# Patient Record
Sex: FEMALE | Race: WHITE | Employment: UNEMPLOYED | URBAN - METROPOLITAN AREA
[De-identification: names, ages, dates, MRNs, and addresses within clinical notes are randomized per-mention and may not be internally consistent; named-entity substitution may affect disease eponyms.]

---

## 2017-01-06 ENCOUNTER — TRANSFERRED RECORDS (OUTPATIENT)
Dept: HEALTH INFORMATION MANAGEMENT | Facility: CLINIC | Age: 27
End: 2017-01-06

## 2017-01-06 LAB
CREAT SERPL-MCNC: 0.7 MG/DL (ref 0.55–1.02)
GFR SERPL CREATININE-BSD FRML MDRD: >60 ML/MIN/1.73M2
HBA1C MFR BLD: 7.7 % (ref 4–5.6)
LDLC SERPL CALC-MCNC: 84 MG/DL (ref 0–130)

## 2017-04-19 ENCOUNTER — TRANSFERRED RECORDS (OUTPATIENT)
Dept: HEALTH INFORMATION MANAGEMENT | Facility: CLINIC | Age: 27
End: 2017-04-19

## 2017-04-25 ENCOUNTER — OFFICE VISIT (OUTPATIENT)
Dept: PEDIATRICS | Facility: CLINIC | Age: 27
End: 2017-04-25
Payer: COMMERCIAL

## 2017-04-25 VITALS
DIASTOLIC BLOOD PRESSURE: 60 MMHG | OXYGEN SATURATION: 97 % | HEART RATE: 83 BPM | WEIGHT: 134.5 LBS | HEIGHT: 64 IN | TEMPERATURE: 98.3 F | BODY MASS INDEX: 22.96 KG/M2 | SYSTOLIC BLOOD PRESSURE: 112 MMHG

## 2017-04-25 DIAGNOSIS — Z30.09 COUNSELING FOR BIRTH CONTROL, INTRAUTERINE DEVICE: ICD-10-CM

## 2017-04-25 DIAGNOSIS — E10.319 TYPE 1 DIABETES MELLITUS WITH RETINOPATHY WITHOUT MACULAR EDEMA, UNSPECIFIED LATERALITY, UNSPECIFIED RETINOPATHY SEVERITY (H): Primary | ICD-10-CM

## 2017-04-25 DIAGNOSIS — Z23 NEED FOR HPV VACCINATION: ICD-10-CM

## 2017-04-25 PROCEDURE — 99207 C FOOT EXAM  NO CHARGE: CPT | Performed by: INTERNAL MEDICINE

## 2017-04-25 PROCEDURE — 90651 9VHPV VACCINE 2/3 DOSE IM: CPT | Performed by: INTERNAL MEDICINE

## 2017-04-25 PROCEDURE — 87591 N.GONORRHOEAE DNA AMP PROB: CPT | Performed by: INTERNAL MEDICINE

## 2017-04-25 PROCEDURE — 99213 OFFICE O/P EST LOW 20 MIN: CPT | Mod: 25 | Performed by: INTERNAL MEDICINE

## 2017-04-25 PROCEDURE — 87491 CHLMYD TRACH DNA AMP PROBE: CPT | Performed by: INTERNAL MEDICINE

## 2017-04-25 PROCEDURE — 90471 IMMUNIZATION ADMIN: CPT | Performed by: INTERNAL MEDICINE

## 2017-04-25 NOTE — MR AVS SNAPSHOT
After Visit Summary   4/25/2017    Robyn Marcelo    MRN: [id number]           Patient Information     Date Of Birth          1990        Visit Information        Provider Department      4/25/2017 8:30 AM Claudine Sears MD [facility]        Today's Diagnoses     Type 1 diabetes mellitus with retinopathy without macular edema, unspecified laterality, unspecified retinopathy severity (H)    -  1    Counseling for birth control, intrauterine device          Care Instructions    For diabetes:  Keep up the great work! Follow-up with [facility], we will request records from them  -- You should get the pneumonia vaccine.    For birth control/IUD  -- STD testing today  -- Follow-up with Lena Patel to talk about this    HPV vaccine today, come back in 2 months for next dose.         Follow-ups after your visit        Who to contact     If you have questions or need follow up information about today's clinic visit or your schedule please contact [facility] directly at [phone].  Normal or non-critical lab and imaging results will be communicated to you by Yogomehart, letter or phone within 4 business days after the clinic has received the results. If you do not hear from us within 7 days, please contact the clinic through MalibuIQt or phone. If you have a critical or abnormal lab result, we will notify you by phone as soon as possible.  Submit refill requests through Xinguodu or call your pharmacy and they will forward the refill request to us. Please allow 3 business days for your refill to be completed.          Additional Information About Your Visit        MyChart Information     Xinguodu gives you secure access to your electronic health record. If you see a primary care provider, you can also send messages to your care team and make appointments. If you have questions, please call your primary care clinic.  If you do not have a primary care provider, please call  "[phone] and they will assist you.        Care EveryWhere ID     This is your Care EveryWhere ID. This could be used by other organizations to access your [place] medical records  [id number]        Your Vitals Were     Pulse Temperature Height Last Period Pulse Oximetry Breastfeeding?    83 98.3  F (36.8  C) (Oral) 5' 4\" (1.626 m) 04/14/2017 (Approximate) 97% No    BMI (Body Mass Index)                   23.09 kg/m2            Blood Pressure from Last 3 Encounters:   04/25/17 112/60   08/19/15 104/68   08/07/15 106/62    Weight from Last 3 Encounters:   04/25/17 134 lb 8 oz (61 kg)   08/19/15 137 lb (62.1 kg)   08/07/15 138 lb 8 oz (62.8 kg)              We Performed the Following     Chlamydia trachomatis PCR     FOOT EXAM     Neisseria gonorrhoeae PCR          Today's Medication Changes          These changes are accurate as of: 4/25/17  9:13 AM.  If you have any questions, ask your nurse or doctor.               These medicines have changed or have updated prescriptions.        Dose/Directions    NovoLOG VIAL 100 UNITS/ML injection   This may have changed:  Another medication with the same name was removed. Continue taking this medication, and follow the directions you see here.   Generic drug:  insulin aspart   Changed by:  Claudine Sears MD        For insulin pump, please see pump settings. patient takes 14.9 units per 24 hours. Bolus 1 unit per 11g carb   Quantity:  30 mL   Refills:  0                Primary Care Provider Office Phone # Fax #    Susannah Thompson [phone]       [facility] [place] [address] [place] [address]        Thank you!     Thank you for choosing [facility]  for your care. Our goal is always to provide you with excellent care. Hearing back from our patients is one way we can continue to improve our services. Please take a few minutes to complete the written survey that you may receive in the mail after your visit with us. " Thank you!             Your Updated Medication List - Protect others around you: Learn how to safely use, store and throw away your medicines at www.disposemymeds.org.          This list is accurate as of: 4/25/17  9:13 AM.  Always use your most recent med list.                   Brand Name Dispense Instructions for use    * ACE/ARB NOT PRESCRIBED (INTENTIONAL)      5 each continuous prn. ACE & ARB not prescribed due to Patient of childbearing potential       amoxicillin-clavulanate 875-125 MG per tablet    AUGMENTIN     TAKE 1 TABLET BY MOUTH TWICE DAILY UNTIL GONE       blood glucose monitoring test strip    no brand specified    1 Box    by In Vitro route 4 times daily.       PicketCAN FINEPOINT LANCETS Misc     100 each    Use to test blood sugars 4 times daily and as needed.       NovoLOG VIAL 100 UNITS/ML injection   Generic drug:  insulin aspart     30 mL    For insulin pump, please see pump settings. patient takes 14.9 units per 24 hours. Bolus 1 unit per 11g carb       * STATIN NOT PRESCRIBED (INTENTIONAL)     0 each    10 each continuous prn. Statin not prescribed intentionally due to Other: Not indicated       * Notice:  This list has 2 medication(s) that are the same as other medications prescribed for you. Read the directions carefully, and ask your doctor or other care provider to review them with you.

## 2017-04-25 NOTE — PATIENT INSTRUCTIONS
For diabetes:  Keep up the great work! Follow-up with [facility], we will request records from them  -- You should get the pneumonia vaccine.    For birth control/IUD  -- STD testing today  -- Follow-up with Lena Patel to talk about this    HPV vaccine today, come back in 2 months for next dose.

## 2017-04-25 NOTE — NURSING NOTE
"Chief Complaint   Patient presents with     Establish Care       Initial /60 (BP Location: Right arm, Patient Position: Chair, Cuff Size: Adult Regular)  Pulse 83  Temp 98.3  F (36.8  C) (Oral)  Ht 5' 4\" (1.626 m)  Wt 134 lb 8 oz (61 kg)  LMP 04/14/2017 (Approximate)  SpO2 97%  Breastfeeding? No  BMI 23.09 kg/m2 Estimated body mass index is 23.09 kg/(m^2) as calculated from the following:    Height as of this encounter: 5' 4\" (1.626 m).    Weight as of this encounter: 134 lb 8 oz (61 kg).  Medication Reconciliation: complete     Bethany Renee MA 4/25/2017 8:49 AM    "

## 2017-04-25 NOTE — PROGRESS NOTES
SUBJECTIVE:                                                    Robyn Marcelo is a 26 year old female who presents to clinic today for the following health issues:      New Patient/Transfer of Care  Pt is here today to establish care with Dr. Sears. Pt would also like to discuss getting an IUD. Pt has been on oral contraceptive in the past.     1. Type 1 DM: She follows at [facility] for management of diabetes. Is currently on an insulin pump and doing well with this. Last Hgba1c less than 6.5. Does have mild retinopathy. Otherwise doing well.    2. Infected pilonidal cyst: Recently diagnosed last week, saw surgery and this was drained. Planning on having this removed after she gets  in 2 months.    3. Sees Dermatologist for mole observation.    4. Contraception: Previously on OCP, interested in having IUD placed. Did have secondary amenorrhea after stopping OCP, wondering if this will be a problem with the IUD.       Problem list and histories reviewed & adjusted, as indicated.  Additional history: as documented    Patient Active Problem List   Diagnosis     CARDIOVASCULAR SCREENING; LDL GOAL LESS THAN 160     Acne     Secondary amenorrhea     Type 1 diabetes mellitus with retinopathy without macular edema (H)     Past Surgical History:   Procedure Laterality Date     EXTRACTION(S) DENTAL         Social History   Substance Use Topics     Smoking status: Never Smoker     Smokeless tobacco: Never Used     Alcohol use Yes      Comment: 2 times per month, 1-2 drinks     Family History   Problem Relation Age of Onset     Hyperlipidemia Mother      Hypertension Mother      Deep Vein Thrombosis Father      Deep Vein Thrombosis Sister      during pregnancy     CANCER Paternal Grandfather      lung           Reviewed and updated as needed this visit by clinical staff  Tobacco  Allergies  Meds  Med Hx  Fam Hx  Soc Hx        ROS:  Constitutional, HEENT, cardiovascular, pulmonary, gi and gu systems are  "negative, except as otherwise noted.    OBJECTIVE:                                                    /60 (BP Location: Right arm, Patient Position: Chair, Cuff Size: Adult Regular)  Pulse 83  Temp 98.3  F (36.8  C) (Oral)  Ht 5' 4\" (1.626 m)  Wt 134 lb 8 oz (61 kg)  LMP 04/14/2017 (Approximate)  SpO2 97%  Breastfeeding? No  BMI 23.09 kg/m2  Body mass index is 23.09 kg/(m^2).  GENERAL: healthy, alert and no distress  PSYCH: mentation appears normal, affect normal/bright  Diabetic foot exam: normal DP and PT pulses, no trophic changes or ulcerative lesions, normal sensory exam and normal monofilament exam    Diagnostic Test Results:  none      ASSESSMENT/PLAN:                                                      1. Type 1 diabetes mellitus with retinopathy without macular edema, unspecified laterality, unspecified retinopathy severity (H)  [facility] for [facility], on insulin pump and previously well controlled. Will request records. Patient does get her eyes examined annually. Otherwise will hold off on labs, medication adjustment today.   - FOOT EXAM  - EYE EXAM (SIMPLE-NONBILLABLE)    2. Counseling for birth control, intrauterine device  Reviewed contraceptive options, patient interested in IUD placement. Will have her follow-up with OB or Lena Patel. Will obtain STI testing today.   - Chlamydia trachomatis PCR  - Neisseria gonorrhoeae PCR    3. Need for HPV vaccination  - HUMAN PAPILLOMA VIRUS (GARDASIL 9) VACCINE    Patient Instructions   For diabetes:  Keep up the great work! Follow-up with [facility], we will request records from them  -- You should get the pneumonia vaccine.    For birth control/IUD  -- STD testing today  -- Follow-up with Lena Patel to talk about this    HPV vaccine today, come back in 2 months for next dose.       Claudine Burns MD  [facility] [place]    "

## 2017-04-25 NOTE — NURSING NOTE
Screening Questionnaire for Adult Immunization    Are you sick today?   No   Do you have allergies to medications, food, a vaccine component or latex?   No   Have you ever had a serious reaction after receiving a vaccination?   No   Do you have a long-term health problem with heart disease, lung disease, asthma, kidney disease, metabolic disease (e.g. diabetes), anemia, or other blood disorder?   Yes   Do you have cancer, leukemia, HIV/AIDS, or any other immune system problem?   No   In the past 3 months, have you taken medications that affect  your immune system, such as prednisone, other steroids, or anticancer drugs; drugs for the treatment of rheumatoid arthritis, Crohn s disease, or psoriasis; or have you had radiation treatments?   No   Have you had a seizure, or a brain or other nervous system problem?   No   During the past year, have you received a transfusion of blood or blood     products, or been given immune (gamma) globulin or antiviral drug?   No   For women: Are you pregnant or is there a chance you could become        pregnant during the next month?   No   Have you received any vaccinations in the past 4 weeks?   No     Immunization questionnaire was positive for at least one answer.       MNVFC doesn't apply on this patient    Per orders of Dr. Sears, injection of 1st HPV given by Jaquelin Renee. Patient instructed to remain in clinic for 20 minutes afterwards, and to report any adverse reaction to me immediately.       Screening performed by Jaquelin Renee on 4/25/2017 at 9:37 AM.

## 2017-04-26 LAB
C TRACH DNA SPEC QL NAA+PROBE: NORMAL
N GONORRHOEA DNA SPEC QL NAA+PROBE: NORMAL
SPECIMEN SOURCE: NORMAL
SPECIMEN SOURCE: NORMAL

## 2017-05-02 ENCOUNTER — OFFICE VISIT (OUTPATIENT)
Dept: PEDIATRICS | Facility: CLINIC | Age: 27
End: 2017-05-02
Payer: COMMERCIAL

## 2017-05-02 VITALS
HEART RATE: 66 BPM | WEIGHT: 133.4 LBS | BODY MASS INDEX: 22.9 KG/M2 | SYSTOLIC BLOOD PRESSURE: 114 MMHG | DIASTOLIC BLOOD PRESSURE: 73 MMHG | TEMPERATURE: 97.7 F

## 2017-05-02 DIAGNOSIS — Z12.4 SCREENING FOR MALIGNANT NEOPLASM OF CERVIX: ICD-10-CM

## 2017-05-02 DIAGNOSIS — Z30.430 ENCOUNTER FOR IUD INSERTION: Primary | ICD-10-CM

## 2017-05-02 PROCEDURE — 58300 INSERT INTRAUTERINE DEVICE: CPT | Performed by: NURSE PRACTITIONER

## 2017-05-02 PROCEDURE — G0145 SCR C/V CYTO,THINLAYER,RESCR: HCPCS | Performed by: NURSE PRACTITIONER

## 2017-05-02 RX ORDER — COPPER 313.4 MG/1
1 INTRAUTERINE DEVICE INTRAUTERINE ONCE
Qty: 1 EACH
Start: 2017-05-02 | End: 2017-05-02

## 2017-05-02 NOTE — PROGRESS NOTES
Subjective:   Robyn Marcelo is a 26 year old female who scheduled an office visit to discuss IUD placement.  CC: Referred from Claudine Sears MD.  She would like this method because of convenience.  She is nulliparous, youngest child is age NA.  She is currently using abstinence for birth control.  She declines. STI screening prior to IUD placement, no new partners since last screening. She notes she is Jehovah witnesses and her boyfriend is in [place] now, mutually monogamous, no hx of sex.      Chief Complaint/ History of Present Illness:Robyn Marcelo is a 26 year old female.   Patient's last menstrual period was 04/14/2017 (approximate)..  A pregnancy test was not done today.  She is currently using Abstinence for birth control.   A chlamydia/gonorrhea test was was not done today because of no new partners since last screening  She is here for an IUD insertion.  Patient has been given written information.  I have reviewed the risks of the IUD including pregnancy, PID, life threatening infection, perforation, expulsion, cramping, changes in bleeding and ovarian cysts. Benefits of the IUD and alternative family planning methods have been discussed.  Patients questions are answered.  Patient has verbalized understanding of risks and benefits and has signed the consent form.    Medical, surgical and social histories reviewed.  Current Outpatient Prescriptions   Medication Sig Dispense Refill     insulin aspart (NOVOLOG VIAL) 100 UNITS/ML injection For insulin pump, please see pump settings. patient takes 14.9 units per 24 hours. Bolus 1 unit per 11g carb 30 mL 0     ACE/ARB NOT PRESCRIBED, INTENTIONAL, 5 each continuous prn. ACE & ARB not prescribed due to Patient of childbearing potential       STATIN NOT PRESCRIBED, INTENTIONAL, 10 each continuous prn. Statin not prescribed intentionally due to Other: Not indicated 0 each 0     glucose blood VI test strips strip by In Vitro route 4 times daily. 1 Box 12      Benbria FINEPOINT LANCETS MISC Use to test blood sugars 4 times daily and as needed. 100 each prn       Objective:   /73  Pulse 66  Temp 97.7  F (36.5  C) (Tympanic)  Wt 133 lb 6.4 oz (60.5 kg)  LMP 04/14/2017 (Approximate)  BMI 22.9 kg/m2  Abdomen: soft, nontender, without hepatosplenomegaly or masses  : normal cervix, adnexae, and uterus without masses or discharge  IUD type: Paragard T-380    Procedure:  Uterus assessed for position and is Anteverted.  Speculum inserted.  Betadine prep of cervix done.  Tenaculum not used.  Uterus sounded to 7 cm's.  Cervical dilators used.   IUD inserted in the usual fashion without problems, ie: resistance, severe protracted pain or excessive bleeding.  Strings trimmed to 2 cm's.  Patient tolerated the procedure  well without any prolonged pain or syncopy.      ASSESSMENT/ PLAN:  (Z30.893) Encounter for IUD insertion  (primary encounter diagnosis)  Comment:   Plan:     Instructions given to patient regarding checking IUD strings, returning to the clinic if pain or inability to check strings and/or irregular bleeding.    Pt to RTC in 4-6 weeks for IUD check.

## 2017-05-02 NOTE — PATIENT INSTRUCTIONS
You may experience a period or spotting that will last over the next week. This usually starts to lighten after a few days. By the time you come back to clinic in 1 month for follow up, this spotting is usually gone, unless you are one of the few that experience spotting that can last up to 3-6 months. You may experience cramping for the remainder of the day and continue to take ibuprofen 2-3 tabs every six hours as needed. This will likely subside over the next day.      Schedule a follow up appointment with me in 1 month. Please contact me by mychart of phone if you should have any unusual symptoms or concerns in the mean time.

## 2017-05-02 NOTE — MR AVS SNAPSHOT
After Visit Summary   5/2/2017    Robyn Marcelo    MRN: [id number]           Patient Information     Date Of Birth          1990        Visit Information        Provider Department      5/2/2017 8:30 AM Lena Patel APRN CNP [facility]        Today's Diagnoses     Encounter for IUD insertion    -  1      Care Instructions    You may experience a period or spotting that will last over the next week. This usually starts to lighten after a few days. By the time you come back to clinic in 1 month for follow up, this spotting is usually gone, unless you are one of the few that experience spotting that can last up to 3-6 months. You may experience cramping for the remainder of the day and continue to take ibuprofen 2-3 tabs every six hours as needed. This will likely subside over the next day.      Schedule a follow up appointment with me in 1 month. Please contact me by mychart of phone if you should have any unusual symptoms or concerns in the mean time.           Follow-ups after your visit        Your next 10 appointments already scheduled     May 30, 2017  8:30 AM CDT   Office Visit with DYLAN Eaton CNP   [facility]    [address]  [address]  [place] [address] [phone]           Bring a current list of meds and any records pertaining to this visit.  For Physicals, please bring immunization records and any forms needing to be filled out.  Please arrive 10 minutes early to complete paperwork.              Who to contact     If you have questions or need follow up information about today's clinic visit or your schedule please contact [facility] directly at [phone].  Normal or non-critical lab and imaging results will be communicated to you by MyChart, letter or phone within 4 business days after the clinic has received the results. If you do not hear from us within 7 days,  please contact the clinic through fabrooms or phone. If you have a critical or abnormal lab result, we will notify you by phone as soon as possible.  Submit refill requests through fabrooms or call your pharmacy and they will forward the refill request to us. Please allow 3 business days for your refill to be completed.          Additional Information About Your Visit        Boost My AdsharVenda Information     fabrooms gives you secure access to your electronic health record. If you see a primary care provider, you can also send messages to your care team and make appointments. If you have questions, please call your primary care clinic.  If you do not have a primary care provider, please call [phone] and they will assist you.        Care EveryWhere ID     This is your Care EveryWhere ID. This could be used by other organizations to access your [facility] medical records  [id number]        Your Vitals Were     Pulse Temperature Last Period BMI (Body Mass Index)          66 97.7  F (36.5  C) (Tympanic) 04/14/2017 (Approximate) 22.9 kg/m2         Blood Pressure from Last 3 Encounters:   05/02/17 114/73   04/25/17 112/60   08/19/15 104/68    Weight from Last 3 Encounters:   05/02/17 133 lb 6.4 oz (60.5 kg)   04/25/17 134 lb 8 oz (61 kg)   08/19/15 137 lb (62.1 kg)              We Performed the Following     INSERTION INTRAUTERINE DEVICE          Today's Medication Changes          These changes are accurate as of: 5/2/17  9:05 AM.  If you have any questions, ask your nurse or doctor.               Start taking these medicines.        Dose/Directions    paragard intrauterine copper   Used for:  Encounter for IUD insertion   Started by:  Lena Patel, APRN CNP        Dose:  1 each   1 each by Intrauterine route once for 1 dose   Quantity:  1 each   Refills:  0            Where to get your medicines      Some of these will need a paper prescription and others can be bought over the counter.  Ask your nurse if you have  questions.     You don't need a prescription for these medications     paragard intrauterine copper                Primary Care Provider Office Phone # Fax #    Claudine Sears [phone]       [facility] [place] [address] [place] [address]        Thank you!     Thank you for choosing [facility]  for your care. Our goal is always to provide you with excellent care. Hearing back from our patients is one way we can continue to improve our services. Please take a few minutes to complete the written survey that you may receive in the mail after your visit with us. Thank you!             Your Updated Medication List - Protect others around you: Learn how to safely use, store and throw away your medicines at www.disposemymeds.org.          This list is accurate as of: 5/2/17  9:05 AM.  Always use your most recent med list.                   Brand Name Dispense Instructions for use    * ACE/ARB NOT PRESCRIBED (INTENTIONAL)      5 each continuous prn. ACE & ARB not prescribed due to Patient of childbearing potential       blood glucose monitoring test strip    no brand specified    1 Box    by In Vitro route 4 times daily.       LIFESCAN FINEPOINT LANCETS Misc     100 each    Use to test blood sugars 4 times daily and as needed.       NovoLOG VIAL 100 UNITS/ML injection   Generic drug:  insulin aspart     30 mL    For insulin pump, please see pump settings. patient takes 14.9 units per 24 hours. Bolus 1 unit per 11g carb       paragard intrauterine copper     1 each    1 each by Intrauterine route once for 1 dose       * STATIN NOT PRESCRIBED (INTENTIONAL)     0 each    10 each continuous prn. Statin not prescribed intentionally due to Other: Not indicated       * Notice:  This list has 2 medication(s) that are the same as other medications prescribed for you. Read the directions carefully, and ask your doctor or other care provider to review them with you.

## 2017-05-02 NOTE — NURSING NOTE
"Chief Complaint   Patient presents with     IUD     IUD consult.       Initial /73  Pulse 66  Temp 97.7  F (36.5  C) (Tympanic)  Wt 133 lb 6.4 oz (60.5 kg)  LMP 04/14/2017 (Approximate)  BMI 22.9 kg/m2 Estimated body mass index is 22.9 kg/(m^2) as calculated from the following:    Height as of 4/25/17: 5' 4\" (1.626 m).    Weight as of this encounter: 133 lb 6.4 oz (60.5 kg).  Medication Reconciliation: complete    "

## 2017-05-05 LAB
COPATH REPORT: NORMAL
PAP: NORMAL

## 2017-05-09 ENCOUNTER — TELEPHONE (OUTPATIENT)
Dept: PEDIATRICS | Facility: CLINIC | Age: 27
End: 2017-05-09

## 2017-05-09 NOTE — TELEPHONE ENCOUNTER
Panel Management Review          Composite cancer screening  Chart review shows that this patient is due/due soon for the following None  Summary:    Patient is due/failing the following:   DIABETIC LABS FAILING ON [facility]    Action needed:   DIABETIC LABS FROM OUTSIDE CLINIC SENT FOR ABSTRACTION TO SATISFY HM.    Type of outreach:    LABS SENT FOR ABSTRACTION.    Questions for provider review:    None                                                                                                                                    Fatuma Unger CMA([facility])

## 2017-05-30 ENCOUNTER — OFFICE VISIT (OUTPATIENT)
Dept: PEDIATRICS | Facility: CLINIC | Age: 27
End: 2017-05-30
Payer: COMMERCIAL

## 2017-05-30 VITALS
WEIGHT: 130.8 LBS | HEART RATE: 55 BPM | DIASTOLIC BLOOD PRESSURE: 55 MMHG | SYSTOLIC BLOOD PRESSURE: 97 MMHG | TEMPERATURE: 97.3 F | BODY MASS INDEX: 22.45 KG/M2

## 2017-05-30 DIAGNOSIS — Z30.431 IUD CHECK UP: Primary | ICD-10-CM

## 2017-05-30 PROCEDURE — 99213 OFFICE O/P EST LOW 20 MIN: CPT | Performed by: NURSE PRACTITIONER

## 2017-05-30 NOTE — PROGRESS NOTES
Chief Complaint/ History of Present Illness:Robyn Marcelo is a 26 year old female here today for IUD follow up.  She was in clinic 3-4 weeks ago for placement of ParaGard IUD.  Since placement, she notes she bled/spotted for 5 days and denies cramping.  She has not had intercourse since placement and denies problems, pain or partner complaints.    No Known Allergies  Current Outpatient Prescriptions   Medication Sig Dispense Refill     insulin aspart (NOVOLOG VIAL) 100 UNITS/ML injection For insulin pump, please see pump settings. patient takes 14.9 units per 24 hours. Bolus 1 unit per 11g carb 30 mL 0     ACE/ARB NOT PRESCRIBED, INTENTIONAL, 5 each continuous prn. ACE & ARB not prescribed due to Patient of childbearing potential       STATIN NOT PRESCRIBED, INTENTIONAL, 10 each continuous prn. Statin not prescribed intentionally due to Other: Not indicated 0 each 0     glucose blood VI test strips strip by In Vitro route 4 times daily. 1 Box 12     LIFESCAN FINEPOINT LANCETS MISC Use to test blood sugars 4 times daily and as needed. 100 each prn      Past Medical History:   Diagnosis Date     Diabetes mellitus (H)      Past Surgical History:   Procedure Laterality Date     C PARAGARD IUD N/A 05/02/2017     EXTRACTION(S) DENTAL         REVIEW OF SYSTEMS  CONSTITUTIONAL: Denies fever, chills and night sweats  BREASTS:  Denies discharge and lumps.    HEART/LUNGS: Denies dyspnea, wheezing, coughing and chest pain.  HEMATOLOGIC: Denies tendency to bruise and history of blood clots.  GENITOURINARY:  Denies urgency, dysuria and hematuria.  NEUROLOGIC:  Denies seizures, weakness and fainting.  PSYCHIATRIC: Negative for changes in mood or affect    EXAM:  BP 97/55  Pulse 55  Temp 97.3  F (36.3  C) (Tympanic)  Wt 130 lb 12.8 oz (59.3 kg)  BMI 22.45 kg/m2  General: Appears well, in no apparent distress  Abdomen: soft, nontender, without hepatosplenomegaly or masses  : normal cervix, adnexae, and uterus without masses  or discharge. IUD strings visualized from os and they were trimmed today.    ASSESSMENT:  (Z30.132) IUD check up  (primary encounter diagnosis)  Comment:   Plan:       PLAN:  Follow up with me if she should have any concerns.

## 2017-05-30 NOTE — MR AVS SNAPSHOT
After Visit Summary   5/30/2017    Robyn Marcelo    MRN: [id number]           Patient Information     Date Of Birth          1990        Visit Information        Provider Department      5/30/2017 8:30 AM Lena Patel APRN CNP [facility]        Today's Diagnoses     IUD check up    -  1       Follow-ups after your visit        Who to contact     If you have questions or need follow up information about today's clinic visit or your schedule please contact [facility] directly at [phone].  Normal or non-critical lab and imaging results will be communicated to you by [employer], letter or phone within 4 business days after the clinic has received the results. If you do not hear from us within 7 days, please contact the clinic through [employer] or phone. If you have a critical or abnormal lab result, we will notify you by phone as soon as possible.  Submit refill requests through [employer] or call your pharmacy and they will forward the refill request to us. Please allow 3 business days for your refill to be completed.          Additional Information About Your Visit        MyChart Information     [employer] gives you secure access to your electronic health record. If you see a primary care provider, you can also send messages to your care team and make appointments. If you have questions, please call your primary care clinic.  If you do not have a primary care provider, please call [phone] and they will assist you.        Care EveryWhere ID     This is your Care EveryWhere ID. This could be used by other organizations to access your [place] medical records  [id number]        Your Vitals Were     Pulse Temperature BMI (Body Mass Index)             55 97.3  F (36.3  C) (Tympanic) 22.45 kg/m2          Blood Pressure from Last 3 Encounters:   05/30/17 97/55   05/02/17 114/73   04/25/17 112/60    Weight from Last 3 Encounters:   05/30/17 130 lb 12.8 oz (59.3 kg)    05/02/17 133 lb 6.4 oz (60.5 kg)   04/25/17 134 lb 8 oz (61 kg)              Today, you had the following     No orders found for display       Primary Care Provider Office Phone # Fax #    Claudine Sears [phone]       [facility] [address] [place] [address]        Thank you!     Thank you for choosing [facility]  for your care. Our goal is always to provide you with excellent care. Hearing back from our patients is one way we can continue to improve our services. Please take a few minutes to complete the written survey that you may receive in the mail after your visit with us. Thank you!             Your Updated Medication List - Protect others around you: Learn how to safely use, store and throw away your medicines at www.disposemymeds.org.          This list is accurate as of: 5/30/17 11:01 AM.  Always use your most recent med list.                   Brand Name Dispense Instructions for use    * ACE/ARB NOT PRESCRIBED (INTENTIONAL)      5 each continuous prn. ACE & ARB not prescribed due to Patient of childbearing potential       blood glucose monitoring test strip    no brand specified    1 Box    by In Vitro route 4 times daily.       LIFESCAN FINEPOINT LANCETS Misc     100 each    Use to test blood sugars 4 times daily and as needed.       NovoLOG VIAL 100 UNITS/ML injection   Generic drug:  insulin aspart     30 mL    For insulin pump, please see pump settings. patient takes 14.9 units per 24 hours. Bolus 1 unit per 11g carb       * STATIN NOT PRESCRIBED (INTENTIONAL)     0 each    10 each continuous prn. Statin not prescribed intentionally due to Other: Not indicated       * Notice:  This list has 2 medication(s) that are the same as other medications prescribed for you. Read the directions carefully, and ask your doctor or other care provider to review them with you.

## 2017-05-30 NOTE — NURSING NOTE
"Chief Complaint   Patient presents with     IUD     IUD recheck.       Initial BP 97/55  Pulse 55  Temp 97.3  F (36.3  C) (Tympanic)  Wt 130 lb 12.8 oz (59.3 kg)  BMI 22.45 kg/m2 Estimated body mass index is 22.45 kg/(m^2) as calculated from the following:    Height as of 4/25/17: 5' 4\" (1.626 m).    Weight as of this encounter: 130 lb 12.8 oz (59.3 kg).  Medication Reconciliation: complete    "

## 2018-05-08 ENCOUNTER — OFFICE VISIT (OUTPATIENT)
Dept: OBGYN | Facility: CLINIC | Age: 28
End: 2018-05-08
Payer: COMMERCIAL

## 2018-05-08 VITALS — DIASTOLIC BLOOD PRESSURE: 60 MMHG | BODY MASS INDEX: 23.34 KG/M2 | WEIGHT: 136 LBS | SYSTOLIC BLOOD PRESSURE: 102 MMHG

## 2018-05-08 DIAGNOSIS — Z30.432 ENCOUNTER FOR IUD REMOVAL: Primary | ICD-10-CM

## 2018-05-08 PROCEDURE — 58301 REMOVE INTRAUTERINE DEVICE: CPT | Performed by: ADVANCED PRACTICE MIDWIFE

## 2018-05-08 NOTE — PROGRESS NOTES
INDICATIONS:                                                      Is a pregnancy test required: No.  Was a consent obtained?  Yes    Robyn Ceballos is a 27 year old female,, Patient's last menstrual period was 2018 (within days). who presents today for IUD removal. Her current IUD was placed 1 ago. She has had problems including Nausea, dysmenorrhea, and menorrhagia  with the IUD. She requests removal of the IUD because she wants to change her method of contraception     She also has reviewed the product brochure.  She has elected to go ahead with the removal  Consent was signed.     Today's PHQ-2 Score:   PHQ-2 (  [employer]) 2017   Q1: Little interest or pleasure in doing things 0   Q2: Feeling down, depressed or hopeless 0   PHQ-2 Score 0       PROCEDURE:                                                      A speculum exam was performed and the cervix was visualized. The IUD string was visualized. Using ring forceps, the string  was grasped and the IUD removed intact.      POST PROCEDURE:                                                      The patient tolerated the procedure well. Patient was discharged in stable condition.    Birth control counseling given., Use of condoms/foam discussed. and Handouts were given to the patient.    DYLAN Isaac CNM

## 2018-05-08 NOTE — MR AVS SNAPSHOT
After Visit Summary   5/8/2018    Robyn Ceballos    MRN: [id number]           Patient Information     Date Of Birth          1990        Visit Information        Provider Department      5/8/2018 10:45 AM Pat Roa [facility]N [facility]        Today's Diagnoses     Encounter for IUD removal    -  1       Follow-ups after your visit        Follow-up notes from your care team     Return if symptoms worsen or fail to improve, for Birth Control .      Who to contact     If you have questions or need follow up information about today's clinic visit or your schedule please contact [facility] directly at [phone].  Normal or non-critical lab and imaging results will be communicated to you by MyChart, letter or phone within 4 business days after the clinic has received the results. If you do not hear from us within 7 days, please contact the clinic through [employer] or phone. If you have a critical or abnormal lab result, we will notify you by phone as soon as possible.  Submit refill requests through [employer] or call your pharmacy and they will forward the refill request to us. Please allow 3 business days for your refill to be completed.          Additional Information About Your Visit        MyChart Information     [employer] gives you secure access to your electronic health record. If you see a primary care provider, you can also send messages to your care team and make appointments. If you have questions, please call your primary care clinic.  If you do not have a primary care provider, please call [phone] and they will assist you.        Care EveryWhere ID     This is your Care EveryWhere ID. This could be used by other organizations to access your [place] medical records  [id number]        Your Vitals Were     Last Period BMI (Body Mass Index)                04/22/2018 (Within Days) 23.34 kg/m2           Blood Pressure from Last 3 Encounters:   05/08/18  102/60   05/30/17 97/55   05/02/17 114/73    Weight from Last 3 Encounters:   05/08/18 136 lb (61.7 kg)   05/30/17 130 lb 12.8 oz (59.3 kg)   05/02/17 133 lb 6.4 oz (60.5 kg)              We Performed the Following     REMOVE INTRAUTERINE DEVICE        Primary Care Provider Office Phone # Fax #    Claudine Sears [phone] [address] [place] [address]        Equal Access to Services     [facility]: Hadii aad ku hadasho Soomaali, waaxda luqadaha, qaybta kaalmada adeegyada, waxay juliin hayaan coleen jansen . So [facility] [phone].    ATENCIÓN: Si habla español, tiene a garcia disposición servicios gratuitos de asistencia lingüística. Llame al [phone].    We comply with applicable federal civil rights laws and [place] laws. We do not discriminate on the basis of race, color, national origin, age, disability, sex, sexual orientation, or gender identity.            Thank you!     Thank you for choosing [facility]  for your care. Our goal is always to provide you with excellent care. Hearing back from our patients is one way we can continue to improve our services. Please take a few minutes to complete the written survey that you may receive in the mail after your visit with us. Thank you!             Your Updated Medication List - Protect others around you: Learn how to safely use, store and throw away your medicines at www.disposemymeds.org.          This list is accurate as of 5/8/18 12:05 PM.  Always use your most recent med list.                   Brand Name Dispense Instructions for use Diagnosis    * ACE/ARB/ARNI NOT PRESCRIBED (INTENTIONAL)      5 each continuous prn. ACE & ARB not prescribed due to Patient of childbearing potential    Diabetes mellitus, type 1       blood glucose monitoring test strip    no brand specified    1 Box    by In Vitro route 4 times daily.    Diabetes mellitus, type 1       LIFESCAN FINEPOINT LANCETS Misc     100 each     Use to test blood sugars 4 times daily and as needed.    Diabetes mellitus, type 1       NovoLOG VIAL 100 UNITS/ML injection   Generic drug:  insulin aspart     30 mL    For insulin pump, please see pump settings. patient takes 14.9 units per 24 hours. Bolus 1 unit per 11g carb        * STATIN NOT PRESCRIBED (INTENTIONAL)     0 each    10 each continuous prn. Statin not prescribed intentionally due to Other: Not indicated    Diabetes mellitus, type 1       * Notice:  This list has 2 medication(s) that are the same as other medications prescribed for you. Read the directions carefully, and ask your doctor or other care provider to review them with you.

## 2018-05-08 NOTE — NURSING NOTE
"Chief Complaint   Patient presents with     IUD     Removal        Initial /60 (BP Location: Right arm, Patient Position: Sitting, Cuff Size: Adult Regular)  Wt 136 lb (61.7 kg)  LMP 2018 (Within Days)  BMI 23.34 kg/m2 Estimated body mass index is 23.34 kg/(m^2) as calculated from the following:    Height as of 17: 5' 4\" (1.626 m).    Weight as of this encounter: 136 lb (61.7 kg).  BP completed using cuff size: regular        The following HM Due: NONE    patient has appointment for today.  Jorge Alcantara CMA                 "

## 2018-05-21 ENCOUNTER — OFFICE VISIT (OUTPATIENT)
Dept: PEDIATRICS | Facility: CLINIC | Age: 28
End: 2018-05-21
Payer: COMMERCIAL

## 2018-05-21 ENCOUNTER — NURSE TRIAGE (OUTPATIENT)
Dept: NURSING | Facility: CLINIC | Age: 28
End: 2018-05-21

## 2018-05-21 VITALS
HEIGHT: 64 IN | BODY MASS INDEX: 23.26 KG/M2 | WEIGHT: 136.25 LBS | HEART RATE: 81 BPM | OXYGEN SATURATION: 98 % | DIASTOLIC BLOOD PRESSURE: 58 MMHG | SYSTOLIC BLOOD PRESSURE: 104 MMHG | TEMPERATURE: 98.4 F

## 2018-05-21 DIAGNOSIS — R10.32 LLQ ABDOMINAL PAIN: Primary | ICD-10-CM

## 2018-05-21 LAB
ALBUMIN UR-MCNC: NEGATIVE MG/DL
APPEARANCE UR: CLEAR
BETA HCG QUAL IFA URINE: NEGATIVE
BILIRUB UR QL STRIP: NEGATIVE
COLOR UR AUTO: YELLOW
ERYTHROCYTE [DISTWIDTH] IN BLOOD BY AUTOMATED COUNT: 12.3 % (ref 10–15)
ERYTHROCYTE [SEDIMENTATION RATE] IN BLOOD BY WESTERGREN METHOD: 8 MM/H (ref 0–20)
GLUCOSE UR STRIP-MCNC: NEGATIVE MG/DL
HCT VFR BLD AUTO: 40.8 % (ref 35–47)
HGB BLD-MCNC: 13.2 G/DL (ref 11.7–15.7)
HGB UR QL STRIP: NEGATIVE
KETONES UR STRIP-MCNC: NEGATIVE MG/DL
LEUKOCYTE ESTERASE UR QL STRIP: NEGATIVE
MCH RBC QN AUTO: 29 PG (ref 26.5–33)
MCHC RBC AUTO-ENTMCNC: 32.4 G/DL (ref 31.5–36.5)
MCV RBC AUTO: 90 FL (ref 78–100)
NITRATE UR QL: NEGATIVE
PH UR STRIP: 7 PH (ref 5–7)
PLATELET # BLD AUTO: 216 10E9/L (ref 150–450)
RBC # BLD AUTO: 4.55 10E12/L (ref 3.8–5.2)
SOURCE: NORMAL
SP GR UR STRIP: 1.01 (ref 1–1.03)
UROBILINOGEN UR STRIP-ACNC: 0.2 EU/DL (ref 0.2–1)
WBC # BLD AUTO: 7.6 10E9/L (ref 4–11)

## 2018-05-21 PROCEDURE — 99214 OFFICE O/P EST MOD 30 MIN: CPT | Performed by: INTERNAL MEDICINE

## 2018-05-21 PROCEDURE — 84703 CHORIONIC GONADOTROPIN ASSAY: CPT | Performed by: INTERNAL MEDICINE

## 2018-05-21 PROCEDURE — 36415 COLL VENOUS BLD VENIPUNCTURE: CPT | Performed by: INTERNAL MEDICINE

## 2018-05-21 PROCEDURE — 85652 RBC SED RATE AUTOMATED: CPT | Performed by: INTERNAL MEDICINE

## 2018-05-21 PROCEDURE — 81003 URINALYSIS AUTO W/O SCOPE: CPT | Performed by: INTERNAL MEDICINE

## 2018-05-21 PROCEDURE — 85027 COMPLETE CBC AUTOMATED: CPT | Performed by: INTERNAL MEDICINE

## 2018-05-21 NOTE — TELEPHONE ENCOUNTER
"  Reason for Disposition    [1] MODERATE (e.g., interferes with normal activities) AND [2] pain comes and goes (cramps) AND [3] present > 24 hours  (Exception: pain with Vomiting or Diarrhea - see that Guideline)    Additional Information    Negative: Sounds like a life-threatening emergency to the triager    Abdominal cramps unrelated to menstrual period    Negative: Shock suspected (e.g., cold/pale/clammy skin, too weak to stand, low BP, rapid pulse)    Negative: Difficult to awaken or acting confused  (e.g., disoriented, slurred speech)    Negative: Passed out (i.e., lost consciousness, collapsed and was not responding)    Negative: Sounds like a life-threatening emergency to the triager    Negative: Chest pain    Negative: Pain is mainly in upper abdomen  (if needed ask: \"is it mainly above the belly button?\")    Negative: Followed an abdomen (stomach) injury    Negative: [1] Abdominal pain AND [2] pregnant < 20 weeks    Negative: [1] Abdominal pain AND [2] pregnant > 20 weeks    Negative: [1] Abdominal pain AND [2] postpartum < 1 month since delivery    Negative: [1] SEVERE pain (e.g., excruciating) AND [2] present > 1 hour     Pain at 7 at worst, coming and going on the left side.    Negative: [1] SEVERE pain AND [2] age > 60    Negative: [1] Vomiting AND [2] contains red blood or black (\"coffee ground\") material  (Exception: few red streaks in vomit that only happened once)    Negative: Blood in bowel movements   (Exception: blood on surface of BM with constipation)    Negative: Black or tarry bowel movements  (Exception: chronic-unchanged  black-grey bowel movements AND is taking iron pills or Pepto-bismol)    Negative: Patient sounds very sick or weak to the triager    Negative: [1] MILD-MODERATE pain AND [2] constant AND [3] present > 2 hours    Negative: [1] Vomiting AND [2] abdomen looks much more swollen than usual    Negative: [1] Vomiting AND [2] contains bile (green color)    Negative: White of the " eyes have turned yellow (i.e., jaundice)    Negative: Fever > 103 F (39.4 C)    Negative: [1] Fever > 101 F (38.3 C) AND [2] age > 60    Negative: [1] Fever > 101 F (38.3 C) AND [2] bedridden (e.g., nursing home patient, CVA, chronic illness, recovering from surgery)    Negative: [1] Fever > 100.5 F (38.1 C) AND [2] diabetes mellitus or weak immune system (e.g., HIV positive, cancer chemo, splenectomy, organ transplant, chronic steroids)    Negative: [1] SEVERE abdominal pain AND [2] present < 1 hour  (all triage questions negative)    Protocols used: ABDOMINAL PAIN - FEMALE-ADULT-AH, ABDOMINAL PAIN - MENSTRUAL CRAMPS-ADULT-AH    I connected her with scheduling for an appointment. She had her IUD removed 3 weeks ago. Her pain is on the lower left side and sometimes into the back.   Lor Diaz RN-BC  [facility]

## 2018-05-21 NOTE — PROGRESS NOTES
SUBJECTIVE:   Robyn Cabral is a 27 year old female who presents to clinic today for the following health issues:      Abdominal Pain      Duration: x3 days     Description (location/character/radiation): lower left quadrant, sharp and cramping - worse when bending over.          Associated flank pain: None    Intensity: currently 3/10 at worst 7/10    Accompanying signs and symptoms:        Fever/Chills: no        Gas/Bloating: no        Nausea/vomitting: no        Diarrhea: no        Dysuria or Hematuria: no     History (previous similar pain/trauma/previous testing): IUD removal on 5/8.     Precipitating or alleviating factors:       Pain worse with eating/BM/urination: urinating        Pain relieved by BM: no     Therapies tried and outcome: None    LMP:  not applicable    Robyn comes in for evaluation of LLQ pain for the past 2-3 days. She reports that she woke up from sleep on Saturday with burning, persistent pain in LLQ since last Saturday. The pain seems to be always there, but waxes and wanes in severity. No pain with BM. Some deep discomfort with urination, but no urethral symptoms and no burning with urination or frequent urination. No blood in stool, no diarrhea or constipation. No nausea, vomiting, no fevers. Of note, she had IUD removed on 5-8, and herlast period was end of April. No vaginal symptoms, including discharge or bleeding or foul odor. She denies any new sexual partners. No other respiratory symptoms.      Of note, her most recent HgbA1c was 7.3 in , and improving.    Problem list and histories reviewed & adjusted, as indicated.  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, unspecified laterality, unspecified retinopathy severity (H)     Encounter for IUD insertion     Past Surgical History:   Procedure  "Laterality Date     C PARAGARD IUD N/A 05/02/2017     EXTRACTION(S) DENTAL         Social History   Substance Use Topics     Smoking status: Never Smoker     Smokeless tobacco: Never Used     Alcohol use Yes      Comment: 2 times per month, 1-2 drinks     Family History   Problem Relation Age of Onset     Hyperlipidemia Mother      Hypertension Mother      Deep Vein Thrombosis Father      Deep Vein Thrombosis Sister      during pregnancy     CANCER Paternal Grandfather      lung           Reviewed and updated as needed this visit by clinical staff  Tobacco  Allergies  Meds  Med Hx  Fam Hx  Soc Hx      ROS:  Constitutional, HEENT, cardiovascular, pulmonary, gi and gu systems are negative, except as otherwise noted.    OBJECTIVE:     /58 (BP Location: Right arm, Patient Position: Chair, Cuff Size: Adult Regular)  Pulse 81  Temp 98.4  F (36.9  C) (Tympanic)  Ht 5' 4\" (1.626 m)  Wt 136 lb 4 oz (61.8 kg)  LMP 04/22/2018 (Within Days)  SpO2 98%  Breastfeeding? No  BMI 23.39 kg/m2  Body mass index is 23.39 kg/(m^2).  GENERAL: healthy, alert and no distress  EYES: Eyes grossly normal to inspection, PERRL and conjunctivae and sclerae normal  HENT: normal cephalic/atraumatic, nose and mouth without ulcers or lesions, oropharynx clear and oral mucous membranes moist  NECK: no adenopathy, no asymmetry, masses, or scars and thyroid normal to palpation  RESP: lungs clear to auscultation - no rales, rhonchi or wheezes  CV: regular rate and rhythm, normal S1 S2, no S3 or S4, no murmur, click or rub, no peripheral edema and peripheral pulses strong  ABDOMEN: +LLQ tenderness, worse on rebound but no guarding. Normoactive bowel sounds. No appreciable masses.     Diagnostic Test Results:  Results for orders placed or performed in visit on 05/21/18   Erythrocyte sedimentation rate auto   Result Value Ref Range    Sed Rate 8 0 - 20 mm/h   CBC with platelets   Result Value Ref Range    WBC 7.6 4.0 - 11.0 10e9/L    RBC " Count 4.55 3.8 - 5.2 10e12/L    Hemoglobin 13.2 11.7 - 15.7 g/dL    Hematocrit 40.8 35.0 - 47.0 %    MCV 90 78 - 100 fl    MCH 29.0 26.5 - 33.0 pg    MCHC 32.4 31.5 - 36.5 g/dL    RDW 12.3 10.0 - 15.0 %    Platelet Count 216 150 - 450 10e9/L   *UA reflex to Microscopic and Culture ([place] and [facility] (except [place] and [place])   Result Value Ref Range    Color Urine Yellow     Appearance Urine Clear     Glucose Urine Negative NEG^Negative mg/dL    Bilirubin Urine Negative NEG^Negative    Ketones Urine Negative NEG^Negative mg/dL    Specific Gravity Urine 1.010 1.003 - 1.035    Blood Urine Negative NEG^Negative    pH Urine 7.0 5.0 - 7.0 pH    Protein Albumin Urine Negative NEG^Negative mg/dL    Urobilinogen Urine 0.2 0.2 - 1.0 EU/dL    Nitrite Urine Negative NEG^Negative    Leukocyte Esterase Urine Negative NEG^Negative    Source Midstream Urine    Beta HCG Qual, Urine - [facility] and [place] ([id number])   Result Value Ref Range    Beta HCG Qual IFA Urine Negative NEG^Negative          ASSESSMENT/PLAN:     1. LLQ abdominal pain  Most concerning for ovarian pathology such as cyst or ruptured cyst at this time. CBC, ESR reassuring; unlikely to be due to acute diverticulitis. UPT negative and UA not consistent with UTI. Ovarian torsion unlikely as pain is not severe and has been ongoing for past 3 days. Will start with pelvic US; reviewed with patient that if symptoms worsen acutely she should be seen emergently in ED for further imaging.   - Erythrocyte sedimentation rate auto  - CBC with platelets  - *UA reflex to Microscopic and Culture ([facility] (except [place] and [place])  - US Pelvic Complete with Transvaginal; Future  - Beta HCG Qual, Urine - [facility] and [place] ([id number])    Patient Instructions   I anticipate this is likely an ovarian cyst or a ruptured cyst. We will get a pelvic ultrasound to take a better look. In the meantime, use heating pad or warm pack to help with the  discomfort. Try using acetaminophen 1000mg three times a day or ibuprofen 600mg every 6-8 hours.    If pain gets worse, or you get fevers, chills, etc, please go into ER for further testing and rapid imaging.       Claudine Burns MD  [facility]

## 2018-05-21 NOTE — MR AVS SNAPSHOT
After Visit Summary   5/21/2018    Robyn Cabral    MRN: [id number]           Patient Information     Date Of Birth          1990        Visit Information        Provider Department      5/21/2018 3:30 PM Claudine Sears MD [facility]        Today's Diagnoses     LLQ abdominal pain    -  1      Care Instructions    I anticipate this is likely an ovarian cyst or a ruptured cyst. We will get a pelvic ultrasound to take a better look. In the meantime, use heating pad or warm pack to help with the discomfort. Try using acetaminophen 1000mg three times a day or ibuprofen 600mg every 6-8 hours.    If pain gets worse, or you get fevers, chills, etc, please go into ER for further testing and rapid imaging.           Follow-ups after your visit        Your next 10 appointments already scheduled     May 23, 2018 11:00 AM CDT   US PELVIC COMPLETE W TRANSVAGINAL with SHUS5   [facility]trasound ([facility])    [address] [place] [id number] [phone]           Please bring a list of your medicines (including vitamins, minerals and over-the-counter drugs). Also, tell your doctor about any allergies you may have. Wear comfortable clothes and leave your valuables at home.  Adults: Drink six 8-ounce glasses of fluid one hour before your exam. Do NOT empty your bladder.  If you need to empty your bladder before your exam, try to release only a little bit of urine. Then, drink another 8oz glass of fluid.  Children: Children who are potty trained should drink at least 4 cups (32 oz) of liquid 45 minutes to one hour prior to the exam. The child s bladder must be full in order to achieve a diagnostic exam. If your child is very uncomfortable or has an urgent need to pee, please notify a technologist; they will try to find out how much longer the wait may be and provide instructions to help relieve the pressure. Occasionally it is medically necessary  "to insert a urinary catheter to fill the bladder.  Please call the Imaging Department at your exam site with any questions.              Future tests that were ordered for you today     Open Future Orders        Priority Expected Expires Ordered    US Pelvic Complete with Transvaginal STAT  5/21/2019 5/21/2018            Who to contact     If you have questions or need follow up information about today's clinic visit or your schedule please contact [facility] directly at [phone].  Normal or non-critical lab and imaging results will be communicated to you by SergeMDhart, letter or phone within 4 business days after the clinic has received the results. If you do not hear from us within 7 days, please contact the clinic through [employer] or phone. If you have a critical or abnormal lab result, we will notify you by phone as soon as possible.  Submit refill requests through [employer] or call your pharmacy and they will forward the refill request to us. Please allow 3 business days for your refill to be completed.          Additional Information About Your Visit        Equ[employer]board Information     [employer] gives you secure access to your electronic health record. If you see a primary care provider, you can also send messages to your care team and make appointments. If you have questions, please call your primary care clinic.  If you do not have a primary care provider, please call [phone] and they will assist you.        Care EveryWhere ID     This is your Care EveryWhere ID. This could be used by other organizations to access your [facility] medical records  [id number]        Your Vitals Were     Pulse Temperature Height Last Period Pulse Oximetry Breastfeeding?    81 98.4  F (36.9  C) (Tympanic) 5' 4\" (1.626 m) 04/22/2018 (Within Days) 98% No    BMI (Body Mass Index)                   23.39 kg/m2            Blood Pressure from Last 3 Encounters:   05/21/18 104/58   05/08/18 102/60   05/30/17 97/55    Weight " from Last 3 Encounters:   05/21/18 136 lb 4 oz (61.8 kg)   05/08/18 136 lb (61.7 kg)   05/30/17 130 lb 12.8 oz (59.3 kg)              We Performed the Following     *UA reflex to Microscopic and Culture ([facility] [place] (except [place] and [place])     Beta HCG Qual, Urine - [facility] and [place] ([id number])     CBC with platelets     Erythrocyte sedimentation rate auto        Primary Care Provider Office Phone # Fax #    Claudine Sears [phone] [address] [place] [address]        Equal Access to Services     [facility]: Hadii aad ku hadasho Soomaali, waaxda luqadaha, qaybta kaalmada adeegyada, boom jansen . So [facility] [phone].    ATENCIÓN: Si habla español, tiene a garcia disposición servicios gratuitos de asistencia lingüística. Llame al [phone].    We comply with applicable federal civil rights laws and [place] laws. We do not discriminate on the basis of race, color, national origin, age, disability, sex, sexual orientation, or gender identity.            Thank you!     Thank you for choosing [facility]  for your care. Our goal is always to provide you with excellent care. Hearing back from our patients is one way we can continue to improve our services. Please take a few minutes to complete the written survey that you may receive in the mail after your visit with us. Thank you!             Your Updated Medication List - Protect others around you: Learn how to safely use, store and throw away your medicines at www.disposemymeds.org.          This list is accurate as of 5/21/18  4:44 PM.  Always use your most recent med list.                   Brand Name Dispense Instructions for use Diagnosis    * ACE/ARB/ARNI NOT PRESCRIBED (INTENTIONAL)      5 each continuous prn. ACE & ARB not prescribed due to Patient of childbearing potential    Diabetes mellitus, type 1       blood glucose monitoring test strip    no  brand specified    1 Box    by In Vitro route 4 times daily.    Diabetes mellitus, type 1       LIFESCAN FINEPOINT LANCETS Misc     100 each    Use to test blood sugars 4 times daily and as needed.    Diabetes mellitus, type 1       NovoLOG VIAL 100 UNITS/ML injection   Generic drug:  insulin aspart     30 mL    For insulin pump, please see pump settings. patient takes 14.9 units per 24 hours. Bolus 1 unit per 11g carb        * STATIN NOT PRESCRIBED (INTENTIONAL)     0 each    10 each continuous prn. Statin not prescribed intentionally due to Other: Not indicated    Diabetes mellitus, type 1       * Notice:  This list has 2 medication(s) that are the same as other medications prescribed for you. Read the directions carefully, and ask your doctor or other care provider to review them with you.

## 2018-05-21 NOTE — PATIENT INSTRUCTIONS
I anticipate this is likely an ovarian cyst or a ruptured cyst. We will get a pelvic ultrasound to take a better look. In the meantime, use heating pad or warm pack to help with the discomfort. Try using acetaminophen 1000mg three times a day or ibuprofen 600mg every 6-8 hours.    If pain gets worse, or you get fevers, chills, etc, please go into ER for further testing and rapid imaging.

## 2018-06-14 ENCOUNTER — TRANSFERRED RECORDS (OUTPATIENT)
Dept: HEALTH INFORMATION MANAGEMENT | Facility: CLINIC | Age: 28
End: 2018-06-14

## 2018-07-11 NOTE — PROGRESS NOTES
Please abstract patient's HgbA1c from [facility] of 7.1 on 6-8-18.     Claudine Sears MD  Internal Medicine-Pediatrics

## 2018-08-02 ENCOUNTER — TELEPHONE (OUTPATIENT)
Dept: PEDIATRICS | Facility: CLINIC | Age: 28
End: 2018-08-02

## 2018-08-02 NOTE — TELEPHONE ENCOUNTER
Panel Management Review      Patient has the following on her problem list:     Diabetes    ASA: Not Required     Last A1C  Lab Results   Component Value Date    A1C 7.7 01/06/2017    A1C 7.3 02/25/2016    A1C 7.0 11/02/2015    A1C 6.5 11/06/2014    A1C 6.5 05/14/2014     A1C tested: MONITOR    Last LDL:    Lab Results   Component Value Date    CHOL 159 03/11/2014     Lab Results   Component Value Date    HDL 55 03/11/2014     Lab Results   Component Value Date    LDL 84 01/06/2017    LDL 63 05/14/2014     Lab Results   Component Value Date    TRIG 167 03/11/2014     Lab Results   Component Value Date    CHOLHDLRATIO 2.9 03/11/2014     No results found for: NHDL    Is the patient on a Statin? NO             Is the patient on Aspirin? NO    Medications     HMG CoA Reductase Inhibitors    STATIN NOT PRESCRIBED, INTENTIONAL,          Last three blood pressure readings:  BP Readings from Last 3 Encounters:   05/21/18 104/58   05/08/18 102/60   05/30/17 97/55       Date of last diabetes office visit: 4/2017     Tobacco History:     History   Smoking Status     Never Smoker   Smokeless Tobacco     Never Used           Composite cancer screening  Chart review shows that this patient is due/due soon for the following None  Summary:    Patient is due/failing the following:   A1C and FOLLOW UP    Action needed:   Patient needs office visit for Diabetes follow up and labs.    Type of outreach:    Pt see's [facility] for Diabetes management    Questions for provider review:    None                                                                                                                                    Bethany Renee MA 8/2/2018 12:17 PM       Chart routed to Close Team .

## 2019-10-24 ENCOUNTER — OFFICE VISIT (OUTPATIENT)
Dept: PEDIATRICS | Facility: CLINIC | Age: 29
End: 2019-10-24
Attending: INTERNAL MEDICINE
Payer: COMMERCIAL

## 2019-10-24 PROCEDURE — G0463 HOSPITAL OUTPT CLINIC VISIT: HCPCS | Mod: ZF

## 2019-10-24 NOTE — PROGRESS NOTES
LACTATION CONSULT/PHYSICIAN REPORT    MOTHER    Doctor: Dr. [facility]     MOTHER'S CONCERNS  Check latch, weight check, assess milk transfer    Medical History  Type 1 diabetic and preeclampsia    Pregnancy History       Breastfeeding History  N/A    Delivery History  Vaginal    Labor Meds/Anesthesia  Epidural    Current Medications  Prenatal Vitamins: Yes  procardia for high BP    Herbals:  Fenugreek product 1x per day    ASSESSMENT OF MOTHER    Physical:   WNL     Milk Supply: WNL for age    PUMPING: Pump in Style      HOME CARE VISIT: Sat 19th of Oct.        BABY       Name: Carlos YOB: 2017 Age: 14d Gestational Age: 38 6/7    Doctor: Dr. [facility]     Complications of Birth: None    Breastfeeding/hospital: Other: jaundice, shield attempts, sleepiness      ASSESSMENT OF BABY    Physical:   WNL     CURRENT BREASTFEEDING ISSUES:   He is sleepy at times for feedings, recently stopped supplementing formula    SUPPLEMENTATION: up to 1 oz post feeds if he was not nursing well, sometimes 1/2 oz, maybe 4 x per day    OUTPUT:   WNL       BABY'S WEIGHT HISTORY    At Delivery:  Date: 10-  Weight: 7-7    At Discharge:  Date: 10-  Weight: 7-5      Age: 14d  Date: 10/24/2019  Weight: 7-14.1       Since discharge from hospital, baby has a gain of 9.1 oz.in 9 days.  Note: Normal weight gain is 1/2 to 1 oz/day in the first 6 months of life.    FEEDING ASSESSMENT  BEFORE INTERVENTION: Pain Levels: L: 1  R: 1  AFTER INTERVENTION: Pain Levels: L: 1  R: 1    INTERVENTIONS/EVALUATION:  Cross Cradle, Asymmetric Latch, Flange lips and Breast Compression    WEIGHT GAIN AT BREAST: (NOTE: 30cc = 1 oz):  At current weight, baby needs approximately 21 oz in 24 hours or 2.6 oz every 3 hours   ( 79 mL) or if he nurses every 2.5 hours, 2.1 oz or 63 mL.    Number order of breastfeeding    16 mL 1st LEFT breast after 5 minutes 30 mL 2nd RIGHT breast after 15 minutes  10  mL 3rd LEFT breast after 10 minutes   TOTAL: 56 cc or 1.87 oz after 30 minutes  ----He also went back on the right side after dressing and had some good swallows prior to moving into his car seat.      SUMMARY  Infant has been nursing well without the supplementation. No need to supplement at this time.  Feed on demand with at least 8 feeds in 24 hours.   No overall concerns .  Good latch noted.  Slight positional adjustments made to increase comfort.        RECOMMENDATIONS  Breast compression while baby nurses to get more milk to baby and move the feeding along faster. Squeeze and hold while baby sucks, let up when baby pauses and repeat, moving thumb to another spot.      Follow up: Patient to call lactation consultant if questions arise    Next visit/Phone call: Doctor: Nov 18th 1 mos appt.      Visit Start time: 12:15    End time: 1:00    Lactation Consultant: Kate Alcantara RN  Date: 10/24/2019    .    [facility]  Phone: [phone]     Fax: [phone]

## 2019-11-04 ENCOUNTER — HEALTH MAINTENANCE LETTER (OUTPATIENT)
Age: 29
End: 2019-11-04

## 2020-11-16 ENCOUNTER — HEALTH MAINTENANCE LETTER (OUTPATIENT)
Age: 30
End: 2020-11-16

## 2021-05-29 ENCOUNTER — HEALTH MAINTENANCE LETTER (OUTPATIENT)
Age: 31
End: 2021-05-29

## 2021-09-18 ENCOUNTER — HEALTH MAINTENANCE LETTER (OUTPATIENT)
Age: 31
End: 2021-09-18

## 2022-01-08 ENCOUNTER — HEALTH MAINTENANCE LETTER (OUTPATIENT)
Age: 32
End: 2022-01-08

## 2022-08-20 ENCOUNTER — HEALTH MAINTENANCE LETTER (OUTPATIENT)
Age: 32
End: 2022-08-20

## 2022-11-20 ENCOUNTER — HEALTH MAINTENANCE LETTER (OUTPATIENT)
Age: 32
End: 2022-11-20

## 2023-04-15 ENCOUNTER — HEALTH MAINTENANCE LETTER (OUTPATIENT)
Age: 33
End: 2023-04-15

## 2023-07-30 NOTE — ADDENDUM NOTE
Addended by: CHRISTOFER RODRIGUEZ on: 5/9/2017 04:56 PM     Modules accepted: Orders     return for any pain

## 2023-08-14 ENCOUNTER — OFFICE VISIT (OUTPATIENT)
Dept: OBGYN | Facility: CLINIC | Age: 33
End: 2023-08-14
Payer: COMMERCIAL

## 2023-08-14 DIAGNOSIS — F42.9 OBSESSIVE-COMPULSIVE DISORDER, UNSPECIFIED TYPE: ICD-10-CM

## 2023-08-14 DIAGNOSIS — F33.1 MODERATE EPISODE OF RECURRENT MAJOR DEPRESSIVE DISORDER (H): ICD-10-CM

## 2023-08-14 DIAGNOSIS — F41.1 GAD (GENERALIZED ANXIETY DISORDER): Primary | ICD-10-CM

## 2023-08-14 PROCEDURE — 90837 PSYTX W PT 60 MINUTES: CPT | Performed by: COUNSELOR

## 2023-08-14 ASSESSMENT — COLUMBIA-SUICIDE SEVERITY RATING SCALE - C-SSRS
1. HAVE YOU WISHED YOU WERE DEAD OR WISHED YOU COULD GO TO SLEEP AND NOT WAKE UP?: NO
2. HAVE YOU ACTUALLY HAD ANY THOUGHTS OF KILLING YOURSELF?: NO
TOTAL  NUMBER OF INTERRUPTED ATTEMPTS LIFETIME: NO
TOTAL  NUMBER OF ABORTED OR SELF INTERRUPTED ATTEMPTS LIFETIME: NO
ATTEMPT LIFETIME: NO
6. HAVE YOU EVER DONE ANYTHING, STARTED TO DO ANYTHING, OR PREPARED TO DO ANYTHING TO END YOUR LIFE?: NO
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
## 2023-08-18 DIAGNOSIS — E10.9 TYPE 1 DIABETES MELLITUS WITHOUT COMPLICATION (H): Primary | ICD-10-CM

## 2023-08-30 ASSESSMENT — ANXIETY QUESTIONNAIRES
GAD7 TOTAL SCORE: 19
1. FEELING NERVOUS, ANXIOUS, OR ON EDGE: NEARLY EVERY DAY
GAD7 TOTAL SCORE: 19
7. FEELING AFRAID AS IF SOMETHING AWFUL MIGHT HAPPEN: NEARLY EVERY DAY
3. WORRYING TOO MUCH ABOUT DIFFERENT THINGS: NEARLY EVERY DAY
6. BECOMING EASILY ANNOYED OR IRRITABLE: MORE THAN HALF THE DAYS
IF YOU CHECKED OFF ANY PROBLEMS ON THIS QUESTIONNAIRE, HOW DIFFICULT HAVE THESE PROBLEMS MADE IT FOR YOU TO DO YOUR WORK, TAKE CARE OF THINGS AT HOME, OR GET ALONG WITH OTHER PEOPLE: VERY DIFFICULT
7. FEELING AFRAID AS IF SOMETHING AWFUL MIGHT HAPPEN: NEARLY EVERY DAY
5. BEING SO RESTLESS THAT IT IS HARD TO SIT STILL: NEARLY EVERY DAY
2. NOT BEING ABLE TO STOP OR CONTROL WORRYING: MORE THAN HALF THE DAYS
4. TROUBLE RELAXING: NEARLY EVERY DAY
8. IF YOU CHECKED OFF ANY PROBLEMS, HOW DIFFICULT HAVE THESE MADE IT FOR YOU TO DO YOUR WORK, TAKE CARE OF THINGS AT HOME, OR GET ALONG WITH OTHER PEOPLE?: VERY DIFFICULT
GAD7 TOTAL SCORE: 19

## 2023-08-30 ASSESSMENT — PATIENT HEALTH QUESTIONNAIRE - PHQ9
SUM OF ALL RESPONSES TO PHQ QUESTIONS 1-9: 14
10. IF YOU CHECKED OFF ANY PROBLEMS, HOW DIFFICULT HAVE THESE PROBLEMS MADE IT FOR YOU TO DO YOUR WORK, TAKE CARE OF THINGS AT HOME, OR GET ALONG WITH OTHER PEOPLE: SOMEWHAT DIFFICULT
SUM OF ALL RESPONSES TO PHQ QUESTIONS 1-9: 14

## 2023-08-31 ENCOUNTER — LAB (OUTPATIENT)
Dept: LAB | Facility: CLINIC | Age: 33
End: 2023-08-31
Payer: COMMERCIAL

## 2023-08-31 ENCOUNTER — OFFICE VISIT (OUTPATIENT)
Dept: BEHAVIORAL HEALTH | Facility: CLINIC | Age: 33
End: 2023-08-31
Payer: COMMERCIAL

## 2023-08-31 DIAGNOSIS — F42.2 MIXED OBSESSIONAL THOUGHTS AND ACTS: ICD-10-CM

## 2023-08-31 DIAGNOSIS — F41.1 GAD (GENERALIZED ANXIETY DISORDER): ICD-10-CM

## 2023-08-31 DIAGNOSIS — F33.0 MDD (MAJOR DEPRESSIVE DISORDER), RECURRENT EPISODE, MILD (H): Primary | ICD-10-CM

## 2023-08-31 DIAGNOSIS — E10.9 TYPE 1 DIABETES MELLITUS WITHOUT COMPLICATION (H): ICD-10-CM

## 2023-08-31 LAB
ANION GAP SERPL CALCULATED.3IONS-SCNC: 11 MMOL/L (ref 7–15)
BUN SERPL-MCNC: 6.3 MG/DL (ref 6–20)
CALCIUM SERPL-MCNC: 9.5 MG/DL (ref 8.6–10)
CHLORIDE SERPL-SCNC: 101 MMOL/L (ref 98–107)
CHOLEST SERPL-MCNC: 168 MG/DL
CREAT SERPL-MCNC: 0.74 MG/DL (ref 0.51–0.95)
CREAT UR-MCNC: 22.3 MG/DL
DEPRECATED HCO3 PLAS-SCNC: 28 MMOL/L (ref 22–29)
GFR SERPL CREATININE-BSD FRML MDRD: >90 ML/MIN/1.73M2
GLUCOSE SERPL-MCNC: 265 MG/DL (ref 70–99)
HBA1C MFR BLD: 7.5 % (ref 0–5.6)
HDLC SERPL-MCNC: 67 MG/DL
HGB BLD-MCNC: 13.3 G/DL (ref 11.7–15.7)
LDLC SERPL CALC-MCNC: 77 MG/DL
MICROALBUMIN UR-MCNC: <12 MG/L
MICROALBUMIN/CREAT UR: NORMAL MG/G{CREAT}
NONHDLC SERPL-MCNC: 101 MG/DL
POTASSIUM SERPL-SCNC: 4.2 MMOL/L (ref 3.4–5.3)
SODIUM SERPL-SCNC: 140 MMOL/L (ref 136–145)
TRIGL SERPL-MCNC: 118 MG/DL
TSH SERPL DL<=0.005 MIU/L-ACNC: 1.45 UIU/ML (ref 0.3–4.2)

## 2023-08-31 PROCEDURE — 80048 BASIC METABOLIC PNL TOTAL CA: CPT

## 2023-08-31 PROCEDURE — 80061 LIPID PANEL: CPT

## 2023-08-31 PROCEDURE — 85018 HEMOGLOBIN: CPT

## 2023-08-31 PROCEDURE — 82570 ASSAY OF URINE CREATININE: CPT

## 2023-08-31 PROCEDURE — 36415 COLL VENOUS BLD VENIPUNCTURE: CPT

## 2023-08-31 PROCEDURE — 83036 HEMOGLOBIN GLYCOSYLATED A1C: CPT

## 2023-08-31 PROCEDURE — 82043 UR ALBUMIN QUANTITATIVE: CPT

## 2023-08-31 PROCEDURE — 90791 PSYCH DIAGNOSTIC EVALUATION: CPT | Performed by: COUNSELOR

## 2023-08-31 PROCEDURE — 84443 ASSAY THYROID STIM HORMONE: CPT

## 2023-08-31 NOTE — PROGRESS NOTES
"[facility] - Integrated Behavioral Health    Provider Name:  Sierra Kapoor     Credentials:  MSW, CALLIE    PATIENT'S NAME: Robyn Cabral  PREFERRED NAME: Sai  PRONOUNS: She/her  MRN: [id number]  : 1990  ADDRESS: [address] [place] [address]  [facility] [address]  ACCT. NUMBER:  [id number]  DATE OF SERVICE: 23  START TIME: 10am  END TIME: 11am  PREFERRED PHONE: [phone]  May we leave a program related message: Yes  SERVICE MODALITY:  In-person    UNIVERSAL ADULT Mental Health DIAGNOSTIC ASSESSMENT    Identifying Information:  Patient is a 32 year old, . The pronoun use throughout this assessment reflects the patient's chosen pronoun. Patient was referred for an assessment by self. Patient attended the session alone.    Chief Complaint:   The reason for seeking services at this time is: \"Anxiety and OCD\". The problem(s) began 23.    Patient has attempted to resolve these concerns in the past through [facility] .    Social/Family History:  Patient reported they grew up in [place], [place]. They were raised by biological parents; grandmother; grandfather. Parents  /  when pt was 5 years old. 1 biological younger sister and 1 younger half siblings, and 2 older step siblings. Patient reported that their childhood was mostly in her mother's home and her father was involved in the periphery. Patient described their current relationships with family of origin as inconsistent.     The patient describes their cultural background as . Cultural influences and impact on patient's life structure, values, norms, and healthcare: I am a Cheondoism.  Contextual influences on patient's health include: none. These factors will be addressed in the Preliminary Treatment plan. Patient identified their preferred language to be English. Patient reported they does not need the assistance of an  or other support " involved in therapy.     Patient reported had no significant delays in developmental tasks. Patient's highest education level was associate degree / vocational certificate. Patient identified the following learning problems: none reported.  Modifications will not be used to assist communication in therapy. Patient reports they are able to understand written materials.    Pt met her  online in Nov 2015. He is from and was living in [place]. They  in 2017, he moved to [place] 1 month prior to marriage. Patient identified their sexual orientation as heterosexual.  Patient reported having 2 child(stephen). Patient identified mother; siblings; friends; spouse as part of their support system.  Patient identified the quality of these relationships as inconsistent,  .      Patient's current living/housing situation involves staying in own home/apartment. The immediate members of family and household include Henrry Cabral, 30, and 2 children ages 3.5 years and 15 months and they report that housing is stable.    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 Patient reports their finances are obtained through spouse. Patient does identify finances as a current stressor.      Patient reported that they have not been involved with the legal system. Patient does not report being under probation/ parole/ jurisdiction.     Patient's Strengths and Limitations:  Patient identified the following strengths or resources that will help them succeed in treatment: [employer] / [employer], commitment to health and well being, community involvement, miya / spirituality, friends / good social support, family support, insight, intelligence, motivation, and strong social skills. Things that may interfere with the patient's success in treatment include: none identified.     Assessments:  The following assessments were completed by patient for this visit:  PHQ9:       8/30/2023    11:15 PM   PHQ-9 SCORE   PHQ-9 Total  Score MyChart 14 (Moderate depression)   PHQ-9 Total Score 14     GAD7:       8/30/2023    11:15 PM   CHARMAINE-7 SCORE   Total Score 19 (severe anxiety)   Total Score 19     CAGE-AID:       8/30/2023    11:32 PM   CAGE-AID Total Score   Total Score 1   Total Score MyChart 1 (A total score of 2 or greater is considered clinically significant)     PROMIS 10-Global Health (all questions and answers displayed):       8/30/2023    11:31 PM   PROMIS 10   In general, would you say your health is: Good   In general, would you say your quality of life is: Good   In general, how would you rate your physical health? Fair   In general, how would you rate your mental health, including your mood and your ability to think? Fair   In general, how would you rate your satisfaction with your social activities and relationships? Good   In general, please rate how well you carry out your usual social activities and roles Fair   To what extent are you able to carry out your everyday physical activities such as walking, climbing stairs, carrying groceries, or moving a chair? Mostly   In the past 7 days, how often have you been bothered by emotional problems such as feeling anxious, depressed, or irritable? Often   In the past 7 days, how would you rate your fatigue on average? Moderate   In the past 7 days, how would you rate your pain on average, where 0 means no pain, and 10 means worst imaginable pain? 2   In general, would you say your health is: 3   In general, would you say your quality of life is: 3   In general, how would you rate your physical health? 2   In general, how would you rate your mental health, including your mood and your ability to think? 2   In general, how would you rate your satisfaction with your social activities and relationships? 3   In general, please rate how well you carry out your usual social activities and roles. (This includes activities at home, at work and in your community, and responsibilities as a  parent, child, spouse, employee, friend, etc.) 2   To what extent are you able to carry out your everyday physical activities such as walking, climbing stairs, carrying groceries, or moving a chair? 4   In the past 7 days, how often have you been bothered by emotional problems such as feeling anxious, depressed, or irritable? 4   In the past 7 days, how would you rate your fatigue on average? 3   In the past 7 days, how would you rate your pain on average, where 0 means no pain, and 10 means worst imaginable pain? 2   Global Mental Health Score 10   Global Physical Health Score 13   PROMIS TOTAL - SUBSCORES 23     Porter Suicide Severity Rating Scale (Lifetime/Recent)      8/14/2023    12:00 PM   Porter Suicide Severity Rating (Lifetime/Recent)   Q1 Wish to be Dead (Lifetime) N   Q2 Non-Specific Active Suicidal Thoughts (Lifetime) N   Actual Attempt (Lifetime) N   Has subject engaged in non-suicidal self-injurious behavior? (Lifetime) N   Interrupted Attempts (Lifetime) N   Aborted or Self-Interrupted Attempt (Lifetime) N   Preparatory Acts or Behavior (Lifetime) N   Calculated C-SSRS Risk Score (Lifetime/Recent) No Risk Indicated       Personal and Family Medical History:  Patient does report a family history of mental health concerns.  Patient reports family history includes Cancer in her paternal grandfather; Deep Vein Thrombosis in her father and sister; Hyperlipidemia in her mother; Hypertension in her mother..     Patient does report Mental Health Diagnosis and/or Treatment. Patient Patient reported the following previous diagnoses which include(s): an anxiety disorder; obsessive compulsive disorder . Patient reported symptoms began many years ago. Patient has received mental health services in the past:  therapy.  Psychiatric Hospitalizations: none.  Patient denies a history of civil commitment. Currently, patient none  receiving other mental health services.    Patient has had a physical exam to rule out  medical causes for current symptoms.  Date of last physical exam was within the past year. Client was encouraged to follow up with PCP if symptoms were to develop. The patient does not have a Primary Care Provider and was encouraged to establish care with a PCP. Patient reports no current medical and/or dental concerns. Patient denies any issues with pain. There are not significant appetite / nutritional concerns / weight changes. Patient reports a history of disordered eating on and off in her 20s but this ended in 2017. Patient does not report a history of head injury / trauma / cognitive impairment.    Patient reports current meds as:   Current Outpatient Medications   Medication    ACE/ARB NOT PRESCRIBED, INTENTIONAL,    glucose blood VI test strips strip    insulin aspart (NOVOLOG VIAL) 100 UNITS/ML injection    [facility] FINEPOINT LANCETS MISC    STATIN NOT PRESCRIBED, INTENTIONAL,     No current facility-administered medications for this visit.        Medication Adherence:  Patient reports taking.    Patient Allergies:  No Known Allergies    Medical History:    Past Medical History:   Diagnosis Date    Diabetes mellitus (H)          Current Mental Status Exam:   Appearance:  Appropriate    Eye Contact:  Good   Psychomotor:  Normal       Gait / station:  no problem  Attitude / Demeanor: Cooperative   Speech      Rate / Production: Normal/ Responsive      Volume:  Normal  volume      Language:  intact  Mood:   Anxious   Affect:   Appropriate    Thought Content: Clear   Thought Process: Coherent  Logical       Associations: No loosening of associations  Insight:   Good   Judgment:  Intact   Orientation:  All  Attention/concentration: Good    Substance Use:  Patient did not report a family history of substance use concerns; see medical history section for details.  Patient has not received chemical dependency treatment in the past.  Patient has not ever been to detox.      Patient is not currently receiving any  chemical dependency treatment.           Substance History of use Age of first use Date of last use     Pattern and duration of use (include amounts and frequency)   Alcohol used in the past   20 05/20/23 REPORTS SUBSTANCE USE: N/A   Cannabis   never used     REPORTS SUBSTANCE USE: N/A     Amphetamines   never used     REPORTS SUBSTANCE USE: N/A   Cocaine/crack    never used       REPORTS SUBSTANCE USE: N/A   Hallucinogens never used         REPORTS SUBSTANCE USE: N/A   Inhalants never used         REPORTS SUBSTANCE USE: N/A   Heroin never used         REPORTS SUBSTANCE USE: N/A   Other Opiates never used     REPORTS SUBSTANCE USE: N/A   Benzodiazepine   never used     REPORTS SUBSTANCE USE: N/A   Barbiturates never used     REPORTS SUBSTANCE USE: N/A   Over the counter meds never used     REPORTS SUBSTANCE USE: N/A   Caffeine currently use 20   REPORTS SUBSTANCE USE: reports using substance 1 times per day and has 1   at a time.   Patient reports heaviest use is current use.   Nicotine  never used     REPORTS SUBSTANCE USE: N/A   Other substances not listed above:  Identify:  never used     REPORTS SUBSTANCE USE: N/A     Patient reported the following problems as a result of their substance use: no problems, not applicable.    Substance Use: No symptoms    Based on the negative CAGE score and clinical interview there  are not indications of drug or alcohol abuse.    Significant Losses / Trauma / Abuse / Neglect Issues:   Patient did not serve in the .  There are indications or report of significant loss, trauma, abuse or neglect issues related to: death of aunt last winter and medical issues with close family members .  Concerns for possible neglect are not present.     Safety Assessment:   Patient denies current homicidal ideation and behaviors.  Patient denies current self-injurious ideation and behaviors.    Patient denied risk behaviors associated with substance use.  Patient denies any high risk  behaviors associated with mental health symptoms.  Patient reports the following current concerns for their personal safety: None.  Patient reports there are not firearms in the house.       History of Safety Concerns:  Patient denied a history of homicidal ideation.     Patient denied a history of personal safety concerns.    Patient denied a history of assaultive behaviors.    Patient denied a history of sexual assault behaviors.     Patient denied a history of risk behaviors associated with substance use.  Patient denies any history of high risk behaviors associated with mental health symptoms.  Patient reports the following protective factors: forward or future oriented thinking; dedication to family or friends; safe and stable environment; regular sleep; regular physical activity; daily obligations; commitment to well being; healthy fear of risky behaviors or pain; sense of personal control or determination    Risk Plan: See Recommendations for Safety and Risk Management Plan    Review of Symptoms per patient report:  Depression: Lack of interest, Excessive or inappropriate guilt, Difficulties concentrating, Low self-worth, Ruminations, Irritability, Feeling sad, down, or depressed, Withdrawn, Frequent crying, and Anger outbursts  Lucita:  No Symptoms  Psychosis: No Symptoms  Anxiety: Excessive worry, Physical complaints, such as headaches, stomachaches, muscle tension, Ruminations, Poor concentration, Irritability, and Anger outbursts  Panic:  No symptoms  Post Traumatic Stress Disorder:  No Symptoms   Eating Disorder: No Symptoms  ADD / ADHD:  No symptoms  Conduct Disorder: No symptoms  Autism Spectrum Disorder: No symptoms  Obsessive Compulsive Disorder: Checking, Cleaning, Obsessions, Washing, and Quaker scrupulosity    Patient reports the following compulsive behaviors and treatment history: Shopping / Spending - has not had treatment..      Diagnostic Criteria:   Generalized Anxiety Disorder  A.  Excessive anxiety and worry about a number of events or activities (such as work or school performance).   B. The person finds it difficult to control the worry.  C. Select 3 or more symptoms (required for diagnosis). Only one item is required in children.   - Restlessness or feeling keyed up or on edge.    - Difficulty concentrating or mind going blank.    - Irritability.    - Muscle tension.   D. The focus of the anxiety and worry is not confined to features of an Axis I disorder.  E. The anxiety, worry, or physical symptoms cause clinically significant distress or impairment in social, occupational, or other important areas of functioning.   F. The disturbance is not due to the direct physiological effects of a substance (e.g., a drug of abuse, a medication) or a general medical condition (e.g., hyperthyroidism) and does not occur exclusively during a Mood Disorder, a Psychotic Disorder, or a Pervasive Developmental Disorder. Major Depressive Disorder  A) Recurrent episode(s) - symptoms have been present during the same 2-week period and represent a change from previous functioning 5 or more symptoms (required for diagnosis)   - Depressed mood. Note: In children and adolescents, can be irritable mood.     - Diminished interest or pleasure in all, or almost all, activities.    - Feelings of worthlessness or inappropriate and excessive guilt.    - Diminished ability to think or concentrate, or indecisiveness.    - Recurrent thoughts of death (not just fear of dying), recurrent suicidal ideation without a specific plan, or a suicide attempt or a specific plan for committing suicide.   B) The symptoms cause clinically significant distress or impairment in social, occupational, or other important areas of functioning  C) The episode is not attributable to the physiological effects of a substance or to another medical condition  D) The occurence of major depressive episode is not better explained by other thought /  psychotic disorders  E) There has never been a manic episode or hypomanic episode Obsessive Compulsive Disorder Criteria: Obsessive Compulsive Disorder    (1) recurrent and persistent thoughts, impulses, or images that are experienced, at some time during the disturbance, as intrusive and inappropriate and that cause marked anxiety or distress     (2) the thoughts, impulses, or images are not simply excessive worries about real-life problems     (3) the client attempts to ignore or suppress such thoughts, impulses, or images, or to neutralize them with some other thought or action     (4) the client recognizes that the obsessional thoughts, impulses, or images are a product of his or her own mind (not imposed from without as in thought insertion)   Client experiences Compulsions as defined by (1) and (2):    (1) repetitive behaviors (e.g., hand washing, ordering, checking) or mental acts (e.g., praying, counting, repeating words silently) that the person feels driven to perform in response to an obsession, or according to rules that must be applied rigidly     (2) the behaviors or mental acts are aimed at preventing or reducing distress or preventing some dreaded event or situation; however, these behaviors or mental acts either are not connected in a realistic way with what they are designed to neutralize or prevent or are clearly excessive   At some point during the course of the disorder, the person has recognized that the obsessions or compulsions are excessive or unreasonable  The obsessions or compulsions cause marked distress, are time consuming (take more than 1 hour a day), or significantly interfere with the person's normal routine, occupational (or academic) functioning, or usual social activities or relationships.   The content of the obsessions or compulsions are not restricted to another Axis I Disorder (e.g., preoccupation with food in the presence of an Eating Disorders; hair pulling in the presence  of Trichotillomania; concern with appearance in the presence of Body Dysmorphic Disorder; preoccupation with drugs in the presence of a Substance Use Disorder; preoccupation with having a serious illness in the presence of Hypochondriasis; preoccupation with sexual urges or fantasies in the presence of a Paraphilia; or guilty ruminations in the presence of Major Depressive Disorder).   The disturbance is not due to the direct physiological effects of a substance (e.g., a drug of abuse, a medication) or a general medical condition    Functional Status:  Patient reports the following functional impairments:  childcare / parenting, chronic disease management, health maintenance, home life with family, management of the household and or completion of tasks, money management, organization, relationship(s), social interactions, and work / vocational responsibilities.     Nonprogrammatic care:  Patient is requesting basic services to address current mental health concerns.    Clinical Summary:  1. Reason for assessment: Anxiety and OCD   2. Psychosocial, Cultural and Contextual Factors: Pt is  with 2 children. She stays at home with her children. Pt recognizes heightened anxiety symptoms her whole life and identified OCD symptoms in 2021. Pt is Church and Synagogue is very important to her, though this does interact with anxiety and obsessions.   3. Principal DSM5 Diagnoses  (Sustained by DSM5 Criteria Listed Above):   296.31 (F33.0) Major Depressive Disorder, Recurrent Episode, Mild _  300.02 (F41.1) Generalized Anxiety Disorder  300.3 (F42) Obsessive Compulsive Disorder.  4. Other Diagnoses that is relevant to services:   none.  5. Provisional Diagnosis:  none  6. Prognosis: Return to Baseline Functioning, Expect Improvement, and Relieve Acute Symptoms.  7. Likely consequences of symptoms if not treated: Continued distress from anxiety symptoms.  8. Client strengths include:  caring, educated,  empathetic, goal-focused, good listener, has a previous history of therapy, insightful, intelligent, motivated, open to learning, open to suggestions / feedback, responsible parent, support of family, friends and providers, supportive, wants to learn, willing to ask questions, and willing to relate to others .     Recommendations:     1. Plan for Safety and Risk Management:   Safety and Risk: Recommended that patient call 911 or go to the local ED should there be a change in any of these risk factors..          Report to child / adult protection services was NA.     2. Patient's identified miya / Adventism / spiritual influences will be incorporated into care by open discussion and consideration .     3. Initial Treatment will focus on:    Anxiety - build coping skills .     4. Resources/Service Plan:    services are not indicated.   Modifications to assist communication are not indicated.   Additional disability accommodations are not indicated.      5. Collaboration:   Collaboration / coordination of treatment will be initiated with the following  support professionals:  none .      6.  Referrals:   The following referral(s) will be initiated: Outpatient Mental Abad Therapy. Next Scheduled Appointment: 9/14/2023.      A Release of Information has been obtained for the following:  none .     Emergency Contact was not obtained.      Clinical Substantiation/medical necessity for the above recommendations:   Pt will benefit from therapeutic intervention to improve low mood and heightened anxiety.    7. CHAN:    CHAN:  Discussed the general effects of drugs and alcohol on health and well-being. Provider gave patient printed information about the effects of chemical use on their health and well being. Recommendations:  none .     8. Records:   These were reviewed at time of assessment.   Information in this assessment was obtained from the medical record and  provided by patient who is a good historian.  Patient will have open access to their mental health medical record.    9.   Interactive Complexity: No      Provider Name/ Credentials:  LAKESHIA Cifuentes, CALLIE  August 31, 2023

## 2023-09-14 ENCOUNTER — OFFICE VISIT (OUTPATIENT)
Dept: BEHAVIORAL HEALTH | Facility: CLINIC | Age: 33
End: 2023-09-14
Payer: COMMERCIAL

## 2023-09-14 DIAGNOSIS — F42.2 MIXED OBSESSIONAL THOUGHTS AND ACTS: ICD-10-CM

## 2023-09-14 DIAGNOSIS — F33.0 MDD (MAJOR DEPRESSIVE DISORDER), RECURRENT EPISODE, MILD (H): Primary | ICD-10-CM

## 2023-09-14 DIAGNOSIS — F41.1 GAD (GENERALIZED ANXIETY DISORDER): ICD-10-CM

## 2023-09-14 PROCEDURE — 90834 PSYTX W PT 45 MINUTES: CPT | Performed by: COUNSELOR
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
## 2023-10-02 ENCOUNTER — OFFICE VISIT (OUTPATIENT)
Dept: BEHAVIORAL HEALTH | Facility: CLINIC | Age: 33
End: 2023-10-02
Payer: COMMERCIAL

## 2023-10-02 DIAGNOSIS — F42.2 MIXED OBSESSIONAL THOUGHTS AND ACTS: ICD-10-CM

## 2023-10-02 DIAGNOSIS — F41.1 GAD (GENERALIZED ANXIETY DISORDER): ICD-10-CM

## 2023-10-02 DIAGNOSIS — F33.0 MDD (MAJOR DEPRESSIVE DISORDER), RECURRENT EPISODE, MILD (H): Primary | ICD-10-CM

## 2023-10-02 PROCEDURE — 90834 PSYTX W PT 45 MINUTES: CPT | Performed by: COUNSELOR
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
## 2023-10-19 ENCOUNTER — OFFICE VISIT (OUTPATIENT)
Dept: INTERNAL MEDICINE | Facility: CLINIC | Age: 33
End: 2023-10-19
Payer: COMMERCIAL

## 2023-10-19 VITALS
DIASTOLIC BLOOD PRESSURE: 65 MMHG | SYSTOLIC BLOOD PRESSURE: 128 MMHG | HEIGHT: 64 IN | BODY MASS INDEX: 24.4 KG/M2 | WEIGHT: 142.9 LBS | TEMPERATURE: 99 F | HEART RATE: 72 BPM | OXYGEN SATURATION: 100 %

## 2023-10-19 DIAGNOSIS — F42.2 MIXED OBSESSIONAL THOUGHTS AND ACTS: ICD-10-CM

## 2023-10-19 DIAGNOSIS — F33.1 MAJOR DEPRESSIVE DISORDER, RECURRENT EPISODE, MODERATE (H): Primary | ICD-10-CM

## 2023-10-19 DIAGNOSIS — E10.319 TYPE 1 DIABETES MELLITUS WITH RETINOPATHY WITHOUT MACULAR EDEMA, UNSPECIFIED LATERALITY, UNSPECIFIED RETINOPATHY SEVERITY (H): ICD-10-CM

## 2023-10-19 DIAGNOSIS — Z11.4 SCREENING FOR HIV (HUMAN IMMUNODEFICIENCY VIRUS): ICD-10-CM

## 2023-10-19 DIAGNOSIS — Z11.59 NEED FOR HEPATITIS C SCREENING TEST: ICD-10-CM

## 2023-10-19 DIAGNOSIS — F41.9 ANXIETY: ICD-10-CM

## 2023-10-19 PROCEDURE — 96127 BRIEF EMOTIONAL/BEHAV ASSMT: CPT | Performed by: INTERNAL MEDICINE

## 2023-10-19 PROCEDURE — 90677 PCV20 VACCINE IM: CPT | Performed by: INTERNAL MEDICINE

## 2023-10-19 PROCEDURE — 90471 IMMUNIZATION ADMIN: CPT | Performed by: INTERNAL MEDICINE

## 2023-10-19 PROCEDURE — 90746 HEPB VACCINE 3 DOSE ADULT IM: CPT | Performed by: INTERNAL MEDICINE

## 2023-10-19 PROCEDURE — 99204 OFFICE O/P NEW MOD 45 MIN: CPT | Mod: 25 | Performed by: INTERNAL MEDICINE

## 2023-10-19 PROCEDURE — 90472 IMMUNIZATION ADMIN EACH ADD: CPT | Performed by: INTERNAL MEDICINE

## 2023-10-19 RX ORDER — PAROXETINE 10 MG/1
10 TABLET, FILM COATED ORAL EVERY MORNING
Qty: 30 TABLET | Refills: 0 | Status: SHIPPED | OUTPATIENT
Start: 2023-10-19 | End: 2023-11-10

## 2023-10-19 RX ORDER — INSULIN LISPRO 100 [IU]/ML
INJECTION, SOLUTION INTRAVENOUS; SUBCUTANEOUS
COMMUNITY
Start: 2022-05-30

## 2023-10-19 RX ORDER — INSULIN GLARGINE 100 [IU]/ML
14-16 INJECTION, SOLUTION SUBCUTANEOUS
COMMUNITY
Start: 2022-02-09

## 2023-10-19 ASSESSMENT — ANXIETY QUESTIONNAIRES
6. BECOMING EASILY ANNOYED OR IRRITABLE: NEARLY EVERY DAY
7. FEELING AFRAID AS IF SOMETHING AWFUL MIGHT HAPPEN: MORE THAN HALF THE DAYS
5. BEING SO RESTLESS THAT IT IS HARD TO SIT STILL: MORE THAN HALF THE DAYS
4. TROUBLE RELAXING: MORE THAN HALF THE DAYS
IF YOU CHECKED OFF ANY PROBLEMS ON THIS QUESTIONNAIRE, HOW DIFFICULT HAVE THESE PROBLEMS MADE IT FOR YOU TO DO YOUR WORK, TAKE CARE OF THINGS AT HOME, OR GET ALONG WITH OTHER PEOPLE: EXTREMELY DIFFICULT
1. FEELING NERVOUS, ANXIOUS, OR ON EDGE: NEARLY EVERY DAY
GAD7 TOTAL SCORE: 18
GAD7 TOTAL SCORE: 18
2. NOT BEING ABLE TO STOP OR CONTROL WORRYING: NEARLY EVERY DAY
3. WORRYING TOO MUCH ABOUT DIFFERENT THINGS: NEARLY EVERY DAY

## 2023-10-19 ASSESSMENT — PATIENT HEALTH QUESTIONNAIRE - PHQ9
10. IF YOU CHECKED OFF ANY PROBLEMS, HOW DIFFICULT HAVE THESE PROBLEMS MADE IT FOR YOU TO DO YOUR WORK, TAKE CARE OF THINGS AT HOME, OR GET ALONG WITH OTHER PEOPLE: VERY DIFFICULT
SUM OF ALL RESPONSES TO PHQ QUESTIONS 1-9: 17
SUM OF ALL RESPONSES TO PHQ QUESTIONS 1-9: 17

## 2023-10-19 NOTE — PATIENT INSTRUCTIONS
Patient Education   Paroxetine HCl Oral Tablet 10 mg, 20 mg, 30 mg, 40 mg  This medicine is used for the following purposes:  anxiety  depression  menopausal symptoms  obsessive compulsive disorder  post-traumatic stress disorder  Brand Name(s): Paxil  Generic Name: Paroxetine HCl  Instructions  This medicine may be taken with or without food.  This medicine will work best if you take it at about the same time every day.  Keep the medicine at room temperature. Avoid heat and direct light.  It is important that you keep taking each dose of this medicine on time even if you are feeling well.  If you forget to take a dose on time, take it as soon as you remember. If it is almost time for the next dose, do not take the missed dose. Return to your normal schedule. Do not take 2 doses at one time.  Tell your doctor and pharmacist about all your medicines. Include prescription and over-the-counter medicines, vitamins, and herbal medicines.  Do not suddenly stop taking this medicine. Check with your doctor before stopping.  Cautions  Tell your doctor and pharmacist if you ever had an allergic reaction to a medicine.  Do not use the medication any more than instructed.  Your ability to stay alert or to react quickly may be impaired by this medicine. Do not drive or operate machinery until you know how this medicine will affect you.  Do not drink beverages with alcohol while on this medicine.  Family should check on the patient often. Call the doctor if patient becomes more depressed, has thoughts of suicide, or shows changes in behavior.  Call the doctor if there are any signs of confusion or unusual changes in behavior.  Tell the doctor or pharmacist if you are pregnant, planning to be pregnant, or breastfeeding.  Do not start or stop any other medicines without first speaking to your doctor or pharmacist.  Do not share this medicine with anyone who has not been prescribed this medicine.  Some patients have serious side  effects from this medicine. Ask your pharmacist to show you the information from the Food and Drug Administration (FDA) and discuss it with you.  Side Effects  The following is a list of some common side effects from this medicine. Please speak with your doctor about what you should do if you experience these or other side effects.  agitated feeling or trouble sleeping  decreased appetite  blurry vision  dizziness or drowsiness  dry mouth  lack of energy and tiredness  muscle weakness  nausea  sweating  vivid dreams or nightmares  Call your doctor or get medical help right away if you notice any of these more serious side effects:  bleeding or bruising  coughing up blood or vomit that looks like coffee grounds  pain in the eye  fainting  hallucinations (unusual thoughts, seeing or hearing things that are not real)  fast or irregular heart beats  muscle aches, spasms or abnormal movements  dilation of the pupils  problems with sexual functions or desire  shakiness  dark, tarry stool  blurring or changes of vision  severe or persistent vomiting  A few people may have an allergic reaction to this medicine. Symptoms can include difficulty breathing, skin rash, itching, swelling, or severe dizziness. If you notice any of these symptoms, seek medical help quickly.  Please speak with your doctor, nurse, or pharmacist if you have any questions about this medicine.  IMPORTANT NOTE: This document tells you briefly how to take your medicine, but it does not tell you all there is to know about it. Your doctor or pharmacist may give you other documents about your medicine. Please talk to them if you have any questions. Always follow their advice.  There is a more complete description of this medicine available in English. Scan this code on your smartphone or tablet or use the web address below. You can also ask your pharmacist for a printout. If you have any questions, please ask your pharmacist.  The display and use of this  drug information is subject to Terms of Use.  https://[employer]/V2.0/fdbpem/9095     Copyright(c) 2023 [employer]  Selected from data included with permission and copyright by [employer]. This copyrighted material has been downloaded from a licensed data provider and is not for distribution, except as may be authorized by the applicable terms of use.  Conditions of Use: The information in this database is intended to supplement, not substitute for the expertise and judgment of healthcare professionals. The information is not intended to cover all possible uses, directions, precautions, drug interactions or adverse effects nor should it be construed to indicate that use of a particular drug is safe, appropriate or effective for you or anyone else. A healthcare professional should be consulted before taking any drug, changing any diet or commencing or discontinuing any course of treatment. The display and use of this drug information is subject to express Terms of Use.  Care instructions adapted under license by your healthcare professional. If you have questions about a medical condition or this instruction, always ask your healthcare professional. [employer] disclaims any warranty or liability for your use of this information.

## 2023-10-19 NOTE — PROGRESS NOTES
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  Little interest or pleasure in doing things 1   2.  Feeling down, depressed, or hopeless 1   3.  Trouble falling or staying asleep, or sleeping too much 2   4.  Feeling tired or having little energy 3   5.  Poor appetite or overeating 3   6.  Feeling bad about yourself 2   7.  Trouble concentrating 3   8.  Moving slowly or restless 2   Q9: Thoughts of better off dead/self-harm past 2 weeks 0   PHQ-9 Total Score 17       Follow Up Actions Taken  Depression Action Plan reviewed with patient.  Mental Health Referral placed  Started patient on anti-depressant.     FUTURE APPOINTMENTS:       -1 month    Saul Burgess MD  [facility]   Sai is a 32 year old, presenting for the following health issues:  Consult (Mental health check up per psych. OCD)      History of Present Illness       Mental Health Follow-up:  Patient presents to follow-up on Depression & Anxiety.Patient's depression since last visit has been:  No change  The patient is not having other symptoms associated with depression.  Patient's anxiety since last visit has been:  No change  The  "patient is having other symptoms associated with anxiety.  Any significant life events: financial concerns and health concerns  Patient is feeling anxious or having panic attacks.  Patient has no concerns about alcohol or drug use.    She eats 4 or more servings of fruits and vegetables daily.She consumes 1 sweetened beverage(s) daily.She exercises with enough effort to increase her heart rate 9 or less minutes per day.  She exercises with enough effort to increase her heart rate 3 or less days per week.   She is taking medications regularly.         Patient has been having more trouble with depression and anxiety recently.    Symptoms started first during her first pregnancy about 4 years ago.  They got significantly worse after the pandemic.    Her sister was recently diagnosed with OCD.    Patient's anxiety has been more for the last 2 years.  She briefly saw a therapist at [facility] but it was virtual and she is looking for in person therapy.    She is also wanting to start medication as her  symptoms are getting worse.    She feels her depression symptoms have been the worst recently.    Has type 1 diabetes, was diagnosed at age 12 years.  She  has an endocrinologist at [facility] where she follows regularly.  Has been using insulin pump for the last 10 years.  Last A1c was 7.5.          Review of Systems   Constitutional, HEENT, cardiovascular, pulmonary, gi and gu systems are negative, except as otherwise noted.      Objective    /65   Pulse 72   Temp 99  F (37.2  C) (Temporal)   Ht 1.626 m (5' 4\")   Wt 64.8 kg (142 lb 14.4 oz)   SpO2 100%   Breastfeeding Yes   BMI 24.53 kg/m    Body mass index is 24.53 kg/m .  Physical Exam   GENERAL: healthy, alert and no distress, very pleasant.  NECK: no adenopathy, no asymmetry, masses, or scars and thyroid normal to palpation  RESP: lungs clear to auscultation - no rales, rhonchi or wheezes  CV: regular rate and rhythm, normal S1 S2, no S3 " or S4, no murmur, click or rub, no peripheral edema and peripheral pulses strong  ABDOMEN: soft, nontender, no hepatosplenomegaly, no masses and bowel sounds normal  MS: no gross musculoskeletal defects noted, no edema

## 2023-10-30 ENCOUNTER — OFFICE VISIT (OUTPATIENT)
Dept: BEHAVIORAL HEALTH | Facility: CLINIC | Age: 33
End: 2023-10-30
Payer: COMMERCIAL

## 2023-10-30 DIAGNOSIS — F33.0 MDD (MAJOR DEPRESSIVE DISORDER), RECURRENT EPISODE, MILD (H): Primary | ICD-10-CM

## 2023-10-30 DIAGNOSIS — F42.2 MIXED OBSESSIONAL THOUGHTS AND ACTS: ICD-10-CM

## 2023-10-30 DIAGNOSIS — F41.1 GAD (GENERALIZED ANXIETY DISORDER): ICD-10-CM

## 2023-10-30 PROCEDURE — 90834 PSYTX W PT 45 MINUTES: CPT | Performed by: COUNSELOR
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
## 2023-11-10 ENCOUNTER — OFFICE VISIT (OUTPATIENT)
Dept: FAMILY MEDICINE | Facility: CLINIC | Age: 33
End: 2023-11-10
Payer: COMMERCIAL

## 2023-11-10 VITALS
HEIGHT: 64 IN | HEART RATE: 87 BPM | DIASTOLIC BLOOD PRESSURE: 75 MMHG | BODY MASS INDEX: 24.07 KG/M2 | RESPIRATION RATE: 16 BRPM | WEIGHT: 141 LBS | TEMPERATURE: 97.6 F | SYSTOLIC BLOOD PRESSURE: 127 MMHG | OXYGEN SATURATION: 98 %

## 2023-11-10 DIAGNOSIS — R07.89 ATYPICAL CHEST PAIN: Primary | ICD-10-CM

## 2023-11-10 PROCEDURE — 99212 OFFICE O/P EST SF 10 MIN: CPT | Performed by: FAMILY MEDICINE

## 2023-11-10 RX ORDER — ALBUTEROL SULFATE 90 UG/1
2 AEROSOL, METERED RESPIRATORY (INHALATION) EVERY 4 HOURS PRN
COMMUNITY
Start: 2023-11-08

## 2023-11-10 RX ORDER — PROCHLORPERAZINE 25 MG/1
SUPPOSITORY RECTAL
COMMUNITY
Start: 2023-11-02

## 2023-11-10 NOTE — Clinical Note
Please abstract the following data from this visit with this patient into the appropriate field in Epic:  Tests that can be patient reported without a hard copy:    Other Tests found in the patient's chart through Chart Review/Care Everywhere:  Pap smear done by [facility] on this date: 04/06/2021  Note to [employer]: If this section is blank, no results were found via Chart Review/Care Everywhere.

## 2023-11-10 NOTE — PROGRESS NOTES
"  Assessment & Plan     Atypical chest pain - all diagnostics through [facility] were normal. No recurrence of symptoms. Advised cautiously monitor going forward.     Deb Blankenship MD  [facility] ARIANNA Gibbs is a 32 year old, presenting for the following health issues:  ER F/U (Follow-up ER, was seen at the [facility] in [place] on 11/08/2023 for chest pain)        11/10/2023     8:17 AM   Additional Questions   Roomed by Maribell Morel       [facility]     ED/UC Followup:    Facility:  [facility] in [place]  Date of visit: 11/08/2023  Reason for visit: chest pain  Current Status: has improved      No recurrence of chest pain since she visited the [facility].       Reports anxiety and OCD.   Working with a therapist.   Was prescribed Paxil but hasn't taken it.   Hoping she can manage with therapy alone.        Review of Systems   Constitutional, HEENT, cardiovascular, pulmonary, gi and gu systems are negative, except as otherwise noted.      Objective    /75 (BP Location: Right arm, Patient Position: Chair, Cuff Size: Adult Regular)   Pulse 87   Temp 97.6  F (36.4  C) (Oral)   Resp 16   Ht 1.626 m (5' 4\")   Wt 64 kg (141 lb)   SpO2 98%   Breastfeeding Yes   BMI 24.20 kg/m    Body mass index is 24.2 kg/m .  Physical Exam   GENERAL: healthy, alert and no distress  NECK: no adenopathy, no asymmetry, masses, or scars and thyroid normal to palpation  RESP: lungs clear to auscultation - no rales, rhonchi or wheezes  CV: regular rate and rhythm, normal S1 S2, no S3 or S4, no murmur, click or rub, no peripheral edema and peripheral pulses strong  PSYCH: mentation appears normal, affect normal/bright              "

## 2023-11-15 ASSESSMENT — ANXIETY QUESTIONNAIRES
4. TROUBLE RELAXING: NEARLY EVERY DAY
1. FEELING NERVOUS, ANXIOUS, OR ON EDGE: NEARLY EVERY DAY
5. BEING SO RESTLESS THAT IT IS HARD TO SIT STILL: MORE THAN HALF THE DAYS
7. FEELING AFRAID AS IF SOMETHING AWFUL MIGHT HAPPEN: MORE THAN HALF THE DAYS
2. NOT BEING ABLE TO STOP OR CONTROL WORRYING: MORE THAN HALF THE DAYS
IF YOU CHECKED OFF ANY PROBLEMS ON THIS QUESTIONNAIRE, HOW DIFFICULT HAVE THESE PROBLEMS MADE IT FOR YOU TO DO YOUR WORK, TAKE CARE OF THINGS AT HOME, OR GET ALONG WITH OTHER PEOPLE: VERY DIFFICULT
GAD7 TOTAL SCORE: 18
GAD7 TOTAL SCORE: 18
3. WORRYING TOO MUCH ABOUT DIFFERENT THINGS: NEARLY EVERY DAY
6. BECOMING EASILY ANNOYED OR IRRITABLE: NEARLY EVERY DAY

## 2023-12-07 ENCOUNTER — OFFICE VISIT (OUTPATIENT)
Dept: BEHAVIORAL HEALTH | Facility: CLINIC | Age: 33
End: 2023-12-07
Payer: COMMERCIAL

## 2023-12-07 DIAGNOSIS — F41.1 GAD (GENERALIZED ANXIETY DISORDER): ICD-10-CM

## 2023-12-07 DIAGNOSIS — F42.2 MIXED OBSESSIONAL THOUGHTS AND ACTS: ICD-10-CM

## 2023-12-07 DIAGNOSIS — F33.0 MDD (MAJOR DEPRESSIVE DISORDER), RECURRENT EPISODE, MILD (H): Primary | ICD-10-CM

## 2023-12-07 PROCEDURE — 90834 PSYTX W PT 45 MINUTES: CPT | Performed by: COUNSELOR

## 2023-12-07 ASSESSMENT — PATIENT HEALTH QUESTIONNAIRE - PHQ9
10. IF YOU CHECKED OFF ANY PROBLEMS, HOW DIFFICULT HAVE THESE PROBLEMS MADE IT FOR YOU TO DO YOUR WORK, TAKE CARE OF THINGS AT HOME, OR GET ALONG WITH OTHER PEOPLE: VERY DIFFICULT
SUM OF ALL RESPONSES TO PHQ QUESTIONS 1-9: 14
SUM OF ALL RESPONSES TO PHQ QUESTIONS 1-9: 14
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
## 2023-12-08 ENCOUNTER — TRANSFERRED RECORDS (OUTPATIENT)
Dept: MULTI SPECIALTY CLINIC | Facility: CLINIC | Age: 33
End: 2023-12-08

## 2023-12-08 LAB — RETINOPATHY: NEGATIVE

## 2024-03-21 ENCOUNTER — TRANSFERRED RECORDS (OUTPATIENT)
Dept: MULTI SPECIALTY CLINIC | Facility: CLINIC | Age: 34
End: 2024-03-21

## 2024-03-21 LAB — RETINOPATHY: NORMAL

## 2024-04-07 ENCOUNTER — HEALTH MAINTENANCE LETTER (OUTPATIENT)
Age: 34
End: 2024-04-07

## 2024-04-11 ENCOUNTER — MYC MEDICAL ADVICE (OUTPATIENT)
Dept: FAMILY MEDICINE | Facility: CLINIC | Age: 34
End: 2024-04-11
Payer: COMMERCIAL

## 2024-04-11 NOTE — TELEPHONE ENCOUNTER
Patient Quality Outreach    Patient is due for the following:   Cervical Cancer Screening - PAP Needed    Next Steps:   No follow up needed at this time.    Type of outreach:    Sent [employer] message.    Next Steps:  Reach out within 90 days via [employer].    Max number of attempts reached: No. Will try again in 90 days if patient still on fail list.    Questions for provider review:    None           Nelson Garibay, MAGDY  Chart routed to None.

## 2024-06-16 ENCOUNTER — HEALTH MAINTENANCE LETTER (OUTPATIENT)
Age: 34
End: 2024-06-16

## 2024-08-15 SDOH — HEALTH STABILITY: PHYSICAL HEALTH: ON AVERAGE, HOW MANY MINUTES DO YOU ENGAGE IN EXERCISE AT THIS LEVEL?: 60 MIN

## 2024-08-15 SDOH — HEALTH STABILITY: PHYSICAL HEALTH: ON AVERAGE, HOW MANY DAYS PER WEEK DO YOU ENGAGE IN MODERATE TO STRENUOUS EXERCISE (LIKE A BRISK WALK)?: 5 DAYS

## 2024-08-15 ASSESSMENT — SOCIAL DETERMINANTS OF HEALTH (SDOH): HOW OFTEN DO YOU GET TOGETHER WITH FRIENDS OR RELATIVES?: ONCE A WEEK

## 2024-08-19 ASSESSMENT — PATIENT HEALTH QUESTIONNAIRE - PHQ9
10. IF YOU CHECKED OFF ANY PROBLEMS, HOW DIFFICULT HAVE THESE PROBLEMS MADE IT FOR YOU TO DO YOUR WORK, TAKE CARE OF THINGS AT HOME, OR GET ALONG WITH OTHER PEOPLE: SOMEWHAT DIFFICULT
SUM OF ALL RESPONSES TO PHQ QUESTIONS 1-9: 15
SUM OF ALL RESPONSES TO PHQ QUESTIONS 1-9: 15

## 2024-08-20 ENCOUNTER — MYC MEDICAL ADVICE (OUTPATIENT)
Dept: FAMILY MEDICINE | Facility: CLINIC | Age: 34
End: 2024-08-20

## 2024-08-20 ENCOUNTER — OFFICE VISIT (OUTPATIENT)
Dept: FAMILY MEDICINE | Facility: CLINIC | Age: 34
End: 2024-08-20
Payer: COMMERCIAL

## 2024-08-20 VITALS
HEIGHT: 64 IN | DIASTOLIC BLOOD PRESSURE: 76 MMHG | TEMPERATURE: 98.2 F | OXYGEN SATURATION: 98 % | SYSTOLIC BLOOD PRESSURE: 115 MMHG | WEIGHT: 132 LBS | RESPIRATION RATE: 14 BRPM | HEART RATE: 89 BPM | BODY MASS INDEX: 22.53 KG/M2

## 2024-08-20 DIAGNOSIS — E10.319 TYPE 1 DIABETES MELLITUS WITH RETINOPATHY WITHOUT MACULAR EDEMA, UNSPECIFIED LATERALITY, UNSPECIFIED RETINOPATHY SEVERITY (H): ICD-10-CM

## 2024-08-20 DIAGNOSIS — F41.9 ANXIETY: ICD-10-CM

## 2024-08-20 DIAGNOSIS — Z12.4 CERVICAL CANCER SCREENING: ICD-10-CM

## 2024-08-20 DIAGNOSIS — F42.2 MIXED OBSESSIONAL THOUGHTS AND ACTS: ICD-10-CM

## 2024-08-20 DIAGNOSIS — Z00.00 ROUTINE GENERAL MEDICAL EXAMINATION AT A HEALTH CARE FACILITY: Primary | ICD-10-CM

## 2024-08-20 PROBLEM — F33.1 MAJOR DEPRESSIVE DISORDER, RECURRENT EPISODE, MODERATE (H): Status: RESOLVED | Noted: 2023-10-19 | Resolved: 2024-08-20

## 2024-08-20 PROBLEM — Z30.430 ENCOUNTER FOR IUD INSERTION: Status: RESOLVED | Noted: 2017-05-02 | Resolved: 2024-08-20

## 2024-08-20 PROCEDURE — 87624 HPV HI-RISK TYP POOLED RSLT: CPT | Performed by: FAMILY MEDICINE

## 2024-08-20 PROCEDURE — 99213 OFFICE O/P EST LOW 20 MIN: CPT | Mod: 25 | Performed by: FAMILY MEDICINE

## 2024-08-20 PROCEDURE — G0145 SCR C/V CYTO,THINLAYER,RESCR: HCPCS | Performed by: FAMILY MEDICINE

## 2024-08-20 PROCEDURE — 99395 PREV VISIT EST AGE 18-39: CPT | Performed by: FAMILY MEDICINE

## 2024-08-20 ASSESSMENT — PATIENT HEALTH QUESTIONNAIRE - PHQ9: 5. POOR APPETITE OR OVEREATING: NEARLY EVERY DAY

## 2024-08-20 ASSESSMENT — ANXIETY QUESTIONNAIRES
7. FEELING AFRAID AS IF SOMETHING AWFUL MIGHT HAPPEN: MORE THAN HALF THE DAYS
5. BEING SO RESTLESS THAT IT IS HARD TO SIT STILL: NEARLY EVERY DAY
3. WORRYING TOO MUCH ABOUT DIFFERENT THINGS: NEARLY EVERY DAY
IF YOU CHECKED OFF ANY PROBLEMS ON THIS QUESTIONNAIRE, HOW DIFFICULT HAVE THESE PROBLEMS MADE IT FOR YOU TO DO YOUR WORK, TAKE CARE OF THINGS AT HOME, OR GET ALONG WITH OTHER PEOPLE: VERY DIFFICULT
1. FEELING NERVOUS, ANXIOUS, OR ON EDGE: NEARLY EVERY DAY
GAD7 TOTAL SCORE: 18
GAD7 TOTAL SCORE: 18
2. NOT BEING ABLE TO STOP OR CONTROL WORRYING: MORE THAN HALF THE DAYS
6. BECOMING EASILY ANNOYED OR IRRITABLE: MORE THAN HALF THE DAYS

## 2024-08-20 NOTE — PATIENT INSTRUCTIONS
Patient Education   Preventive Care Advice   This is general advice given by our system to help you stay healthy. However, your care team may have specific advice just for you. Please talk to your care team about your preventive care needs.  Nutrition  Eat 5 or more servings of fruits and vegetables each day.  Try wheat bread, brown rice and whole grain pasta (instead of white bread, rice, and pasta).  Get enough calcium and vitamin D. Check the label on foods and aim for 100% of the RDA (recommended daily allowance).  Lifestyle  Exercise at least 150 minutes each week  (30 minutes a day, 5 days a week).  Do muscle strengthening activities 2 days a week. These help control your weight and prevent disease.  No smoking.  Wear sunscreen to prevent skin cancer.  Have a dental exam and cleaning every 6 months.  Yearly exams  See your health care team every year to talk about:  Any changes in your health.  Any medicines your care team has prescribed.  Preventive care, family planning, and ways to prevent chronic diseases.  Shots (vaccines)   HPV shots (up to age 26), if you've never had them before.  Hepatitis B shots (up to age 59), if you've never had them before.  COVID-19 shot: Get this shot when it's due.  Flu shot: Get a flu shot every year.  Tetanus shot: Get a tetanus shot every 10 years.  Pneumococcal, hepatitis A, and RSV shots: Ask your care team if you need these based on your risk.  Shingles shot (for age 50 and up)  General health tests  Diabetes screening:  Starting at age 35, Get screened for diabetes at least every 3 years.  If you are younger than age 35, ask your care team if you should be screened for diabetes.  Cholesterol test: At age 39, start having a cholesterol test every 5 years, or more often if advised.  Bone density scan (DEXA): At age 50, ask your care team if you should have this scan for osteoporosis (brittle bones).  Hepatitis C: Get tested at least once in your life.  STIs (sexually  transmitted infections)  Before age 24: Ask your care team if you should be screened for STIs.  After age 24: Get screened for STIs if you're at risk. You are at risk for STIs (including HIV) if:  You are sexually active with more than one person.  You don't use condoms every time.  You or a partner was diagnosed with a sexually transmitted infection.  If you are at risk for HIV, ask about PrEP medicine to prevent HIV.  Get tested for HIV at least once in your life, whether you are at risk for HIV or not.  Cancer screening tests  Cervical cancer screening: If you have a cervix, begin getting regular cervical cancer screening tests starting at age 21.  Breast cancer scan (mammogram): If you've ever had breasts, begin having regular mammograms starting at age 40. This is a scan to check for breast cancer.  Colon cancer screening: It is important to start screening for colon cancer at age 45.  Have a colonoscopy test every 10 years (or more often if you're at risk) Or, ask your provider about stool tests like a FIT test every year or Cologuard test every 3 years.  To learn more about your testing options, visit:   .  For help making a decision, visit:   https://bit.ly/ws68482.  Prostate cancer screening test: If you have a prostate, ask your care team if a prostate cancer screening test (PSA) at age 55 is right for you.  Lung cancer screening: If you are a current or former smoker ages 50 to 80, ask your care team if ongoing lung cancer screenings are right for you.  For informational purposes only. Not to replace the advice of your health care provider. Copyright   2023 [facility]. All rights reserved. Clinically reviewed by the [facility]. Reachable 241461 - REV 01/24.  Learning About Stress  What is stress?     Stress is your body's response to a hard situation. Your body can have a physical, emotional, or mental response. Stress is a fact of life for most people, and it  affects everyone differently. What causes stress for you may not be stressful for someone else.  A lot of things can cause stress. You may feel stress when you go on a job interview, take a test, or run a race. This kind of short-term stress is normal and even useful. It can help you if you need to work hard or react quickly. For example, stress can help you finish an important job on time.  Long-term stress is caused by ongoing stressful situations or events. Examples of long-term stress include long-term health problems, ongoing problems at work, or conflicts in your family. Long-term stress can harm your health.  How does stress affect your health?  When you are stressed, your body responds as though you are in danger. It makes hormones that speed up your heart, make you breathe faster, and give you a burst of energy. This is called the fight-or-flight stress response. If the stress is over quickly, your body goes back to normal and no harm is done.  But if stress happens too often or lasts too long, it can have bad effects. Long-term stress can make you more likely to get sick, and it can make symptoms of some diseases worse. If you tense up when you are stressed, you may develop neck, shoulder, or low back pain. Stress is linked to high blood pressure and heart disease.  Stress also harms your emotional health. It can make you segundo, tense, or depressed. Your relationships may suffer, and you may not do well at work or school.  What can you do to manage stress?  You can try these things to help manage stress:   Do something active. Exercise or activity can help reduce stress. Walking is a great way to get started. Even everyday activities such as housecleaning or yard work can help.  Try yoga or jose juan chi. These techniques combine exercise and meditation. You may need some training at first to learn them.  Do something you enjoy. For example, listen to music or go to a movie. Practice your hobby or do volunteer  "work.  Meditate. This can help you relax, because you are not worrying about what happened before or what may happen in the future.  Do guided imagery. Imagine yourself in any setting that helps you feel calm. You can use online videos, books, or a teacher to guide you.  Do breathing exercises. For example:  From a standing position, bend forward from the waist with your knees slightly bent. Let your arms dangle close to the floor.  Breathe in slowly and deeply as you return to a standing position. Roll up slowly and lift your head last.  Hold your breath for just a few seconds in the standing position.  Breathe out slowly and bend forward from the waist.  Let your feelings out. Talk, laugh, cry, and express anger when you need to. Talking with supportive friends or family, a counselor, or a miya leader about your feelings is a healthy way to relieve stress. Avoid discussing your feelings with people who make you feel worse.  Write. It may help to write about things that are bothering you. This helps you find out how much stress you feel and what is causing it. When you know this, you can find better ways to cope.  What can you do to prevent stress?  You might try some of these things to help prevent stress:  Manage your time. This helps you find time to do the things you want and need to do.  Get enough sleep. Your body recovers from the stresses of the day while you are sleeping.  Get support. Your family, friends, and community can make a difference in how you experience stress.  Limit your news feed. Avoid or limit time on social media or news that may make you feel stressed.  Do something active. Exercise or activity can help reduce stress. Walking is a great way to get started.  Where can you learn more?  Go to https://www.healthwise.net/patiented  Enter N032 in the search box to learn more about \"Learning About Stress.\"  Current as of: October 24, 2023               Content Version: 14.0    4289-3460 " [employer].   Care instructions adapted under license by your healthcare professional. If you have questions about a medical condition or this instruction, always ask your healthcare professional. [employer] disclaims any warranty or liability for your use of this information.      Learning About Depression Screening  What is depression screening?  Depression screening is a way to see if you have depression symptoms. It may be done by a doctor or counselor. It's often part of a routine checkup. That's because your mental health is just as important as your physical health.  Depression is a mental health condition that affects how you feel, think, and act. You may:  Have less energy.  Lose interest in your daily activities.  Feel sad and grouchy for a long time.  Depression is very common. It affects people of all ages.  Many things can lead to depression. Some people become depressed after they have a stroke or find out they have a major illness like cancer or heart disease. The death of a loved one or a breakup may lead to depression. It can run in families. Most experts believe that a combination of inherited genes and stressful life events can cause it.  What happens during screening?  You may be asked to fill out a form about your depression symptoms. You and the doctor will discuss your answers. The doctor may ask you more questions to learn more about how you think, act, and feel.  What happens after screening?  If you have symptoms of depression, your doctor will talk to you about your options.  Doctors usually treat depression with medicines or counseling. Often, combining the two works best. Many people don't get help because they think that they'll get over the depression on their own. But people with depression may not get better unless they get treatment.  The cause of depression is not well understood. There may be many factors involved. But if you have depression, it's not  "your fault.  A serious symptom of depression is thinking about death or suicide. If you or someone you care about talks about this or about feeling hopeless, get help right away.  It's important to know that depression can be treated. Medicine, counseling, and self-care may help.  Where can you learn more?  Go to https://www.[employer].net/patiented  Enter T185 in the search box to learn more about \"Learning About Depression Screening.\"  Current as of: June 24, 2023  Content Version: 14.1 2006-2024 [employer].   Care instructions adapted under license by your healthcare professional. If you have questions about a medical condition or this instruction, always ask your healthcare professional. [employer] disclaims any warranty or liability for your use of this information.       "

## 2024-08-20 NOTE — PROGRESS NOTES
Preventive Care Visit  [facility]  Deb Blankenship MD, Family Medicine  Aug 20, 2024      Assessment & Plan     Routine general medical examination at a health care facility    Cervical cancer screening  - Pap Screen with HPV - Recommended Age 30 - 65 Years    Anxiety - ongoing dstruggle, seeing therapy every 2 weeks. Still having feelings of anxiety and panic attacks. Interested in med therapy. Given she has OCD symptoms as well, offered sertraline to start. Script sent. She will contact me in 6 weeks with an update on progress. Contracts for safety today.   - sertraline (ZOLOFT) 50 MG tablet; Take 1/2 tablet for 2 weeks, then increase to 1 tablet daily    Mixed obsessional thoughts and acts - see above  - sertraline (ZOLOFT) 50 MG tablet; Take 1/2 tablet for 2 weeks, then increase to 1 tablet daily    Type 1 diabetes mellitus with retinopathy without macular edema, unspecified laterality, unspecified retinopathy severity (H) - following with endocrinology    Counseling  Appropriate preventive services were addressed with this patient via screening, questionnaire, or discussion as appropriate for fall prevention, nutrition, physical activity, Tobacco-use cessation, social engagement, weight loss and cognition.  Checklist reviewing preventive services available has been given to the patient.  Reviewed patient's diet, addressing concerns and/or questions.   The patient's PHQ-9 score is consistent with moderate depression. She was provided with information regarding depression.       Ko Gibbs is a 33 year old, presenting for the following:  Physical        8/20/2024     3:33 PM   Additional Questions   Roomed by Nelson Garibay   Accompanied by self        Via the Health Maintenance questionnaire, the patient has reported the following services have been completed -Eye Exam: [email or web address] 2023-12-01, this information has been sent to the abstraction team.  [facility]  Directive  Patient does not have a Health Care Directive or Living Will: Discussed advance care planning with patient; information given to patient to review.    HPI      8/15/2024   General Health   How would you rate your overall physical health? (!) FAIR   Feel stress (tense, anxious, or unable to sleep) Rather much      (!) STRESS CONCERN      8/15/2024   Nutrition   Three or more servings of calcium each day? Yes   Diet: Vegetarian/vegan   How many servings of fruit and vegetables per day? 4 or more   How many sweetened beverages each day? 0-1            8/15/2024   Exercise   Days per week of moderate/strenous exercise 5 days   Average minutes spent exercising at this level 60 min            8/15/2024   Social Factors   Frequency of gathering with friends or relatives Once a week   Worry food won't last until get money to buy more No   Food not last or not have enough money for food? No   Do you have housing? (Housing is defined as stable permanent housing and does not include staying ouside in a car, in a tent, in an abandoned building, in an overnight shelter, or couch-surfing.) Yes   Are you worried about losing your housing? No   Lack of transportation? No   Unable to get utilities (heat,electricity)? No            8/15/2024   Dental   Dentist two times every year? Yes            8/15/2024   TB Screening   Were you born outside of the [place]? No          Today's PHQ-9 Score:       8/19/2024     9:13 PM   PHQ-9 SCORE   PHQ-9 Total Score MyChart 15 (Moderately severe depression)   PHQ-9 Total Score 15         8/15/2024   Substance Use   Alcohol more than 3/day or more than 7/wk No   Do you use any other substances recreationally? No        Social History     Tobacco Use    Smoking status: Never     Passive exposure: Never    Smokeless tobacco: Never   Vaping Use    Vaping status: Never Used   Substance Use Topics    Alcohol use: Not Currently     Comment: 2 times per month, 1-2 drinks    Drug use: Never         "  Mammogram Screening - Patient under 40 years of age: Routine Mammogram Screening not recommended.           8/15/2024   One time HIV Screening   Previous HIV test? I don't know          8/15/2024   STI Screening   New sexual partner(s) since last STI/HIV test? No        History of abnormal Pap smear: No - age 30- 64 PAP with HPV every 5 years recommended        5/2/2017    10:48 AM 3/17/2014    12:00 AM   PAP / HPV   PAP (Historical) NIL  NIL            8/15/2024   Contraception/Family Planning   Questions about contraception or family planning No           Reviewed and updated as needed this visit by Provider                    Patient Active Problem List   Diagnosis    Type 1 diabetes mellitus with retinopathy without macular edema, unspecified laterality, unspecified retinopathy severity (H)    Anxiety    Mixed obsessional thoughts and acts     Past Surgical History:   Procedure Laterality Date    EXTRACTION(S) DENTAL      HC PARAGARD IUD N/A 05/02/2017       Social History     Tobacco Use    Smoking status: Never     Passive exposure: Never    Smokeless tobacco: Never   Substance Use Topics    Alcohol use: Not Currently     Comment: 2 times per month, 1-2 drinks     Family History   Problem Relation Age of Onset    Hyperlipidemia Mother     Hypertension Mother     Deep Vein Thrombosis Father     Asthma Father     Deep Vein Thrombosis Sister         during pregnancy    Cancer Paternal Grandfather         lung    Other Cancer Maternal Grandfather         Lung    Mental Illness Sister         OCD             Review of Systems  Constitutional, neuro, ENT, endocrine, pulmonary, cardiac, gastrointestinal, genitourinary, musculoskeletal, integument and psychiatric systems are negative, except as otherwise noted.     Objective    Exam  /76 (BP Location: Right arm, Patient Position: Chair, Cuff Size: Adult Regular)   Pulse 89   Temp 98.2  F (36.8  C) (Oral)   Resp 14   Ht 1.626 m (5' 4\")   Wt 59.9 kg (132 " "lb)   LMP 08/04/2024   SpO2 98%   Breastfeeding No   BMI 22.66 kg/m     Estimated body mass index is 22.66 kg/m  as calculated from the following:    Height as of this encounter: 1.626 m (5' 4\").    Weight as of this encounter: 59.9 kg (132 lb).    Physical Exam  GENERAL: alert and no distress  EYES: Eyes grossly normal to inspection, PERRL and conjunctivae and sclerae normal  HENT: ear canals and TM's normal, nose and mouth without ulcers or lesions  NECK: no adenopathy, no asymmetry, masses, or scars  RESP: lungs clear to auscultation - no rales, rhonchi or wheezes  BREAST: normal without masses, tenderness or nipple discharge and no palpable axillary masses or adenopathy  CV: regular rate and rhythm, normal S1 S2, no S3 or S4, no murmur, click or rub, no peripheral edema  ABDOMEN: soft, nontender, no hepatosplenomegaly, no masses and bowel sounds normal   (female) w/bimanual: normal female external genitalia, normal urethral meatus, normal vaginal mucosa, and normal cervix/adnexa/uterus without masses or discharge  MS: no gross musculoskeletal defects noted, no edema  SKIN: no suspicious lesions or rashes  NEURO: Normal strength and tone, mentation intact and speech normal  PSYCH: mentation appears normal, affect normal/bright        Signed Electronically by: Deb Blankenship MD    "

## 2024-08-22 LAB
HPV HR 12 DNA CVX QL NAA+PROBE: NEGATIVE
HPV16 DNA CVX QL NAA+PROBE: NEGATIVE
HPV18 DNA CVX QL NAA+PROBE: NEGATIVE
HUMAN PAPILLOMA VIRUS FINAL DIAGNOSIS: NORMAL

## 2024-08-27 LAB
BKR AP ASSOCIATED HPV REPORT: NORMAL
BKR LAB AP GYN ADEQUACY: NORMAL
BKR LAB AP GYN INTERPRETATION: NORMAL
BKR LAB AP PREVIOUS ABNORMAL: NORMAL
PATH REPORT.COMMENTS IMP SPEC: NORMAL
PATH REPORT.COMMENTS IMP SPEC: NORMAL
PATH REPORT.RELEVANT HX SPEC: NORMAL

## 2024-11-01 ENCOUNTER — MYC MEDICAL ADVICE (OUTPATIENT)
Dept: FAMILY MEDICINE | Facility: CLINIC | Age: 34
End: 2024-11-01
Payer: COMMERCIAL

## 2024-11-03 ENCOUNTER — HEALTH MAINTENANCE LETTER (OUTPATIENT)
Age: 34
End: 2024-11-03

## 2024-11-08 ENCOUNTER — VIRTUAL VISIT (OUTPATIENT)
Dept: FAMILY MEDICINE | Facility: CLINIC | Age: 34
End: 2024-11-08
Payer: COMMERCIAL

## 2024-11-08 DIAGNOSIS — F42.2 MIXED OBSESSIONAL THOUGHTS AND ACTS: ICD-10-CM

## 2024-11-08 DIAGNOSIS — F41.9 ANXIETY: Primary | ICD-10-CM

## 2024-11-08 PROCEDURE — 99214 OFFICE O/P EST MOD 30 MIN: CPT | Mod: 95 | Performed by: FAMILY MEDICINE

## 2024-11-08 ASSESSMENT — PATIENT HEALTH QUESTIONNAIRE - PHQ9
SUM OF ALL RESPONSES TO PHQ QUESTIONS 1-9: 9
SUM OF ALL RESPONSES TO PHQ QUESTIONS 1-9: 9
10. IF YOU CHECKED OFF ANY PROBLEMS, HOW DIFFICULT HAVE THESE PROBLEMS MADE IT FOR YOU TO DO YOUR WORK, TAKE CARE OF THINGS AT HOME, OR GET ALONG WITH OTHER PEOPLE: SOMEWHAT DIFFICULT

## 2024-11-08 NOTE — PROGRESS NOTES
Sai is a 33 year old who is being evaluated via a billable video visit.    How would you like to obtain your AVS? MyChart  If the video visit is dropped, the invitation should be resent by: Text to cell phone: [phone]  Will anyone else be joining your video visit? No      Assessment & Plan     Anxiety - noticed a difference with starting sertraline, now feeling she doesn't have the same anxiety control. Will escalate dose, particularly in the setting of symptom below, which typically requires higher dosing.   - sertraline (ZOLOFT) 50 MG tablet; Take 1.5-2 tablets ( mg) by mouth daily.    Mixed obsessional thoughts and acts - as above  - sertraline (ZOLOFT) 50 MG tablet; Take 1.5-2 tablets ( mg) by mouth daily.        Subjective   Sai is a 33 year old, presenting for the following health issues:  Recheck Medication        11/8/2024    12:56 PM   Additional Questions   Roomed by Nelson Garibay     History of Present Illness       Reason for visit:  Zoloft dosage discussion    She eats 2-3 servings of fruits and vegetables daily.She consumes 0 sweetened beverage(s) daily.She exercises with enough effort to increase her heart rate 9 or less minutes per day.  She exercises with enough effort to increase her heart rate 3 or less days per week.   She is taking medications regularly.         Review of Systems  Constitutional, HEENT, cardiovascular, pulmonary, gi and gu systems are negative, except as otherwise noted.      Objective           Vitals:  No vitals were obtained today due to virtual visit.    Physical Exam   GENERAL: alert and no distress  EYES: Eyes grossly normal to inspection.  No discharge or erythema, or obvious scleral/conjunctival abnormalities.  RESP: No audible wheeze, cough, or visible cyanosis.    SKIN: Visible skin clear. No significant rash, abnormal pigmentation or lesions.  NEURO: Cranial nerves grossly intact.  Mentation and speech appropriate for age.  PSYCH: Appropriate affect,  tone, and pace of words        10/19/2023    11:29 AM 11/15/2023     3:27 PM 8/20/2024     3:43 PM   CHARMAINE-7 SCORE   Total Score 18 (severe anxiety) 18 (severe anxiety)    Total Score 18 18    18 18             Video-Visit Details    Type of service:  Video Visit   Originating Location (pt. Location): Home    Distant Location (provider location):  Off-site  Platform used for Video Visit: Bridget  Signed Electronically by: Deb Blankenship MD

## 2024-11-14 ENCOUNTER — TRANSFERRED RECORDS (OUTPATIENT)
Dept: HEALTH INFORMATION MANAGEMENT | Facility: CLINIC | Age: 34
End: 2024-11-14
Payer: COMMERCIAL

## 2024-12-09 DIAGNOSIS — F41.9 ANXIETY: ICD-10-CM

## 2024-12-09 DIAGNOSIS — F42.2 MIXED OBSESSIONAL THOUGHTS AND ACTS: ICD-10-CM

## 2025-02-04 ENCOUNTER — VIRTUAL VISIT (OUTPATIENT)
Dept: FAMILY MEDICINE | Facility: CLINIC | Age: 35
End: 2025-02-04
Payer: COMMERCIAL

## 2025-02-04 DIAGNOSIS — K30 GASTROINTESTINAL DISTRESS: Primary | ICD-10-CM

## 2025-02-04 DIAGNOSIS — F42.2 MIXED OBSESSIONAL THOUGHTS AND ACTS: ICD-10-CM

## 2025-02-04 DIAGNOSIS — F41.9 ANXIETY: ICD-10-CM

## 2025-02-04 PROBLEM — E10.319: Status: RESOLVED | Noted: 2017-04-25 | Resolved: 2025-02-04

## 2025-02-04 PROCEDURE — 98006 SYNCH AUDIO-VIDEO EST MOD 30: CPT | Performed by: FAMILY MEDICINE

## 2025-02-04 ASSESSMENT — PATIENT HEALTH QUESTIONNAIRE - PHQ9: SUM OF ALL RESPONSES TO PHQ QUESTIONS 1-9: 8

## 2025-02-04 NOTE — PROGRESS NOTES
Sai is a 34 year old who is being evaluated via a billable video visit.    How would you like to obtain your AVS? MyChart  If the video visit is dropped, the invitation should be resent by: Text to cell phone: [phone]  Will anyone else be joining your video visit? No      Assessment & Plan     Gastrointestinal distress -  having abdominal pain, stool changes, fatigue, headaches. She is concerned about cadmium and lead poisoning, as she's read the dark chocolate she has been eating for years is a source of these heavy metals. Suggested GI consult to investigate GI symptoms. Will order heavy metal panel to further investigate her concerns.   - Blood metal panel; Future  - Adult GI  Referral - Consult Only; Future    Anxiety - she reports stable, refills  - sertraline (ZOLOFT) 50 MG tablet; Take 1.5 tablets (75 mg) by mouth daily.    Mixed obsessional thoughts and acts - as above  - sertraline (ZOLOFT) 50 MG tablet; Take 1.5 tablets (75 mg) by mouth daily.      Subjective   Sai is a 34 year old, presenting for the following health issues:  No chief complaint on file.        2/4/2025     3:39 PM   Additional Questions   Roomed by Nelson Garibay       Via the Health Maintenance questionnaire, the patient has reported the following services have been completed -[facility]21, this information has been sent to the abstraction team.  History of Present Illness       Reason for visit:  I have concerns over potential high lead and cadmium levels in my system after learning food I have eaten daily for years is confirmed to contain very unhealthy levels. GI issues have been very prevalent for me as well.  Symptom onset:  More than a month  Symptoms include:  IBS like symptoms, increased anxiety, headaches, fatigue, ADHD like focus  Symptom intensity:  Moderate  Symptom progression:  Worsening  Had these symptoms before:  Yes  Has tried/received treatment for these  symptoms:  No  What makes it worse:  No  What makes it better:  No   She is taking medications regularly.         Review of Systems  Constitutional, HEENT, cardiovascular, pulmonary, gi and gu systems are negative, except as otherwise noted.      Objective           Vitals:  No vitals were obtained today due to virtual visit.    Physical Exam   GENERAL: alert and no distress  EYES: Eyes grossly normal to inspection.  No discharge or erythema, or obvious scleral/conjunctival abnormalities.  RESP: No audible wheeze, cough, or visible cyanosis.    SKIN: Visible skin clear. No significant rash, abnormal pigmentation or lesions.  NEURO: Cranial nerves grossly intact.  Mentation and speech appropriate for age.  PSYCH: Appropriate affect, tone, and pace of words        Video-Visit Details    Type of service:  Video Visit   Originating Location (pt. Location): Home    Distant Location (provider location):  On-site  Platform used for Video Visit: Bridget  Signed Electronically by: Deb Blankenship MD

## 2025-02-28 ENCOUNTER — OFFICE VISIT (OUTPATIENT)
Dept: FAMILY MEDICINE | Facility: CLINIC | Age: 35
End: 2025-02-28
Payer: COMMERCIAL

## 2025-02-28 VITALS
RESPIRATION RATE: 14 BRPM | TEMPERATURE: 98.1 F | SYSTOLIC BLOOD PRESSURE: 110 MMHG | WEIGHT: 130 LBS | HEIGHT: 64 IN | OXYGEN SATURATION: 100 % | BODY MASS INDEX: 22.2 KG/M2 | DIASTOLIC BLOOD PRESSURE: 69 MMHG | HEART RATE: 82 BPM

## 2025-02-28 DIAGNOSIS — R00.2 HEART PALPITATIONS: ICD-10-CM

## 2025-02-28 DIAGNOSIS — E10.319 TYPE 1 DIABETES MELLITUS WITH RETINOPATHY WITHOUT MACULAR EDEMA, UNSPECIFIED LATERALITY, UNSPECIFIED RETINOPATHY SEVERITY (H): Primary | ICD-10-CM

## 2025-02-28 DIAGNOSIS — F42.2 MIXED OBSESSIONAL THOUGHTS AND ACTS: ICD-10-CM

## 2025-02-28 DIAGNOSIS — F41.9 ANXIETY: ICD-10-CM

## 2025-02-28 LAB
EST. AVERAGE GLUCOSE BLD GHB EST-MCNC: 160 MG/DL
HBA1C MFR BLD: 7.2 % (ref 0–5.6)

## 2025-02-28 PROCEDURE — 82570 ASSAY OF URINE CREATININE: CPT | Performed by: FAMILY MEDICINE

## 2025-02-28 PROCEDURE — 83036 HEMOGLOBIN GLYCOSYLATED A1C: CPT | Performed by: FAMILY MEDICINE

## 2025-02-28 PROCEDURE — 84443 ASSAY THYROID STIM HORMONE: CPT | Performed by: FAMILY MEDICINE

## 2025-02-28 PROCEDURE — 82043 UR ALBUMIN QUANTITATIVE: CPT | Performed by: FAMILY MEDICINE

## 2025-02-28 PROCEDURE — 80048 BASIC METABOLIC PNL TOTAL CA: CPT | Performed by: FAMILY MEDICINE

## 2025-02-28 PROCEDURE — 36415 COLL VENOUS BLD VENIPUNCTURE: CPT | Performed by: FAMILY MEDICINE

## 2025-02-28 PROCEDURE — 83735 ASSAY OF MAGNESIUM: CPT | Performed by: FAMILY MEDICINE

## 2025-02-28 PROCEDURE — 80061 LIPID PANEL: CPT | Performed by: FAMILY MEDICINE

## 2025-02-28 RX ORDER — SERTRALINE HYDROCHLORIDE 100 MG/1
100 TABLET, FILM COATED ORAL DAILY
Qty: 90 TABLET | Refills: 0 | Status: SHIPPED | OUTPATIENT
Start: 2025-02-28

## 2025-02-28 ASSESSMENT — ENCOUNTER SYMPTOMS: PALPITATIONS: 1

## 2025-02-28 NOTE — PATIENT INSTRUCTIONS
Start 100 mg/day dose of sertraline, and monitor glucose. If palpitations continue despite careful glucose management and increased sertraline dose, let me know if you want to do a heart monitor study for a week.

## 2025-02-28 NOTE — PROGRESS NOTES
"  {PROVIDER CHARTING PREFERENCE:091756}    Subjective   Sai is a 34 year old, presenting for the following health issues:  Palpitations (- patient has never been looked at for this concern)        2/28/2025     3:14 PM   Additional Questions   Roomed by AMIRAH Montanez   Accompanied by Self     Palpitations    History of Present Illness       Reason for visit:  Heart palpitations  Symptom onset:  More than a month  Symptoms include:  Fluttering chest, tight chest, slight shortness of breath  Symptom intensity:  Moderate  Symptom progression:  Staying the same  Had these symptoms before:  No   She is taking medications regularly.        {MA/LPN/RN Pre-Provider Visit Orders- hCG/UA/Strep (Optional):669883}  {SUPERLIST (Optional):416748}  {additonal problems for provider to add (Optional):418790}    {ROS Picklists (Optional):265148}    Patient started Zoloft treatment last September. She decreased dosage from 100 to 75 mg about one month ago, hoping to eventually stop taking medication. She feels this medication has helped her OCD symptoms.     She has not noted increased heart rate when she checks her radial pulse. She feels her heart rate in her chest is fasting.     She took her blood pressure 2 days ago, when it was 95/60, with heart rate from 65-75.     She consumes maximum of 2 cups of black tea/day.     She has not been using a continuous glucose monitor for about a year due to cost. She will be restarting this today.     She is concerned that low blood sugar may be related to the palpitation.     She feels her anxiety was better controlled on the 100 mg/day dose of sertraline.       Objective    /69 (BP Location: Right arm, Patient Position: Sitting, Cuff Size: Adult Regular)   Pulse 82   Temp 98.1  F (36.7  C) (Oral)   Resp 14   Ht 1.626 m (5' 4\")   Wt 59 kg (130 lb)   LMP 02/15/2025 (Approximate)   SpO2 100%   Breastfeeding No   BMI 22.31 kg/m    Body mass index is 22.31 kg/m .  Physical " Exam   {Exam List (Optional):991477}    {Diagnostic Test Results (Optional):481728}        Signed Electronically by: Jefferson Carranza DO  {Email feedback regarding this note to [email or web address]   :[id number]}  Answers submitted by the patient for this visit:  General Questionnaire (Submitted on 2/27/2025)  Chief Complaint: Chronic problems general questions HPI Form  How many days per week do you miss taking your medication?: 0  General Concern (Submitted on 2/27/2025)  Chief Complaint: Chronic problems general questions HPI Form  What is the reason for your visit today?: Heart palpitations  When did your symptoms begin?: More than a month  What are your symptoms?: Fluttering chest, tight chest, slight shortness of breath  How would you describe these symptoms?: Moderate  Are your symptoms:: Staying the same  Have you had these symptoms before?: No  Questionnaire about: Chronic problems general questions HPI Form (Submitted on 2/27/2025)  Chief Complaint: Chronic problems general questions HPI Form

## 2025-03-01 LAB
ANION GAP SERPL CALCULATED.3IONS-SCNC: 8 MMOL/L (ref 7–15)
BUN SERPL-MCNC: 7.7 MG/DL (ref 6–20)
CALCIUM SERPL-MCNC: 10.2 MG/DL (ref 8.8–10.4)
CHLORIDE SERPL-SCNC: 101 MMOL/L (ref 98–107)
CHOLEST SERPL-MCNC: 165 MG/DL
CREAT SERPL-MCNC: 0.68 MG/DL (ref 0.51–0.95)
CREAT UR-MCNC: 42.9 MG/DL
EGFRCR SERPLBLD CKD-EPI 2021: >90 ML/MIN/1.73M2
FASTING STATUS PATIENT QL REPORTED: NO
FASTING STATUS PATIENT QL REPORTED: NO
GLUCOSE SERPL-MCNC: 142 MG/DL (ref 70–99)
HCO3 SERPL-SCNC: 30 MMOL/L (ref 22–29)
HDLC SERPL-MCNC: 70 MG/DL
LDLC SERPL CALC-MCNC: 80 MG/DL
MICROALBUMIN UR-MCNC: <12 MG/L
MICROALBUMIN/CREAT UR: NORMAL MG/G{CREAT}
NONHDLC SERPL-MCNC: 95 MG/DL
POTASSIUM SERPL-SCNC: 4 MMOL/L (ref 3.4–5.3)
SODIUM SERPL-SCNC: 139 MMOL/L (ref 135–145)
TRIGL SERPL-MCNC: 77 MG/DL
TSH SERPL DL<=0.005 MIU/L-ACNC: 2.21 UIU/ML (ref 0.3–4.2)

## 2025-03-02 LAB — MAGNESIUM SERPL-MCNC: 2.3 MG/DL (ref 1.7–2.3)

## 2025-04-28 DIAGNOSIS — F41.9 ANXIETY: ICD-10-CM

## 2025-04-28 DIAGNOSIS — F42.2 MIXED OBSESSIONAL THOUGHTS AND ACTS: ICD-10-CM

## 2025-04-28 RX ORDER — SERTRALINE HYDROCHLORIDE 100 MG/1
100 TABLET, FILM COATED ORAL DAILY
Qty: 90 TABLET | Refills: 0 | Status: SHIPPED | OUTPATIENT
Start: 2025-04-28